# Patient Record
Sex: FEMALE | Race: WHITE | NOT HISPANIC OR LATINO | URBAN - METROPOLITAN AREA
[De-identification: names, ages, dates, MRNs, and addresses within clinical notes are randomized per-mention and may not be internally consistent; named-entity substitution may affect disease eponyms.]

---

## 2019-02-27 ENCOUNTER — OUTPATIENT (OUTPATIENT)
Dept: OUTPATIENT SERVICES | Facility: HOSPITAL | Age: 69
LOS: 1 days | End: 2019-02-27
Payer: COMMERCIAL

## 2019-02-27 DIAGNOSIS — Z01.810 ENCOUNTER FOR PREPROCEDURAL CARDIOVASCULAR EXAMINATION: ICD-10-CM

## 2019-02-27 DIAGNOSIS — R06.02 SHORTNESS OF BREATH: ICD-10-CM

## 2019-02-27 DIAGNOSIS — R07.9 CHEST PAIN, UNSPECIFIED: ICD-10-CM

## 2019-02-27 PROCEDURE — 93018 CV STRESS TEST I&R ONLY: CPT

## 2019-02-27 PROCEDURE — 93016 CV STRESS TEST SUPVJ ONLY: CPT

## 2019-02-27 PROCEDURE — 93017 CV STRESS TEST TRACING ONLY: CPT

## 2019-04-05 ENCOUNTER — APPOINTMENT (OUTPATIENT)
Dept: PULMONOLOGY | Facility: CLINIC | Age: 69
End: 2019-04-05

## 2019-05-16 ENCOUNTER — APPOINTMENT (OUTPATIENT)
Dept: PULMONOLOGY | Facility: CLINIC | Age: 69
End: 2019-05-16
Payer: COMMERCIAL

## 2019-05-16 VITALS
OXYGEN SATURATION: 96 % | WEIGHT: 188 LBS | BODY MASS INDEX: 34.6 KG/M2 | DIASTOLIC BLOOD PRESSURE: 80 MMHG | SYSTOLIC BLOOD PRESSURE: 120 MMHG | HEART RATE: 85 BPM | HEIGHT: 62 IN

## 2019-05-16 DIAGNOSIS — Z82.49 FAMILY HISTORY OF ISCHEMIC HEART DISEASE AND OTHER DISEASES OF THE CIRCULATORY SYSTEM: ICD-10-CM

## 2019-05-16 PROCEDURE — 94010 BREATHING CAPACITY TEST: CPT

## 2019-05-16 PROCEDURE — 99204 OFFICE O/P NEW MOD 45 MIN: CPT | Mod: 25

## 2019-05-16 NOTE — CONSULT LETTER
[Dear  ___] : Dear  [unfilled], [Consult Letter:] : I had the pleasure of evaluating your patient, [unfilled]. [Please see my note below.] : Please see my note below. [Sincerely,] : Sincerely, [FreeTextEntry3] : John Ivan DO [facility]\par Pulmonary Critical Care\par Director Pulmonary Division\par Medical Director Respiratory Therapy\par [facility]\par \par

## 2019-05-16 NOTE — DISCUSSION/SUMMARY
[FreeTextEntry1] : 69-year-old never smoker\par History of asthmatic bronchitis only intermittently with colds, no chronic asthma hx\par Waxing and waning left posterior thorax pain radiating anteriorly\par Cardiac stress test negative, echocardiogram done but she does not know the results\par Number obvious thrombophilic risk factors, will obtain CT to better evaluate for thromboembolic disease and underlying anatomic abnormalities\par No dysuria or urinary symptoms, dyspepsia which does improve with antireflux therapy and I recommended she restart this\par Spirometry is normal as his lung exam and pulse oximetry\par Will reevaluate in 3 months or sooner if needed, we'll contact her sooner with CT scan

## 2019-05-16 NOTE — PHYSICAL EXAM
[General Appearance - Well Developed] : well developed [General Appearance - Well Nourished] : well nourished [Normal Conjunctiva] : the conjunctiva exhibited no abnormalities [Normal Oropharynx] : normal oropharynx [Neck Appearance] : the appearance of the neck was normal [II] : II [Heart Rate And Rhythm] : heart rate and rhythm were normal [Heart Sounds] : normal S1 and S2 [Murmurs] : no murmurs present [Edema] : no peripheral edema present [Respiration, Rhythm And Depth] : normal respiratory rhythm and effort [Exaggerated Use Of Accessory Muscles For Inspiration] : no accessory muscle use [Auscultation Breath Sounds / Voice Sounds] : lungs were clear to auscultation bilaterally [Lungs Percussion] : the lungs were normal to percussion [Abnormal Walk] : normal gait [Nail Clubbing] : no clubbing of the fingernails [] : no rash [Cyanosis, Localized] : no localized cyanosis [Oriented To Time, Place, And Person] : oriented to person, place, and time [No Focal Deficits] : no focal deficits [Affect] : the affect was normal [Impaired Insight] : insight and judgment were intact [Memory Recent] : recent memory was not impaired [FreeTextEntry1] : no chest wall abn

## 2019-05-16 NOTE — HISTORY OF PRESENT ILLNESS
[FreeTextEntry1] : L back and arm pain since November\par worse when burped\par more SOB\par saw Cardiology had stress and echocardiogram\par saw GI - not had any work up to date \par pain has improved\par denies any sig dyspnea\par no cough or sputum\par non smoker\par gets asthma with bronchitis

## 2019-06-03 ENCOUNTER — APPOINTMENT (OUTPATIENT)
Dept: CT IMAGING | Facility: CLINIC | Age: 69
End: 2019-06-03

## 2019-06-20 ENCOUNTER — MESSAGE (OUTPATIENT)
Age: 69
End: 2019-06-20

## 2019-06-21 ENCOUNTER — MESSAGE (OUTPATIENT)
Age: 69
End: 2019-06-21

## 2019-07-15 ENCOUNTER — OTHER (OUTPATIENT)
Age: 69
End: 2019-07-15

## 2019-08-19 ENCOUNTER — APPOINTMENT (OUTPATIENT)
Dept: PULMONOLOGY | Facility: CLINIC | Age: 69
End: 2019-08-19

## 2019-10-23 ENCOUNTER — APPOINTMENT (OUTPATIENT)
Dept: PULMONOLOGY | Facility: CLINIC | Age: 69
End: 2019-10-23

## 2019-11-11 ENCOUNTER — APPOINTMENT (OUTPATIENT)
Dept: PULMONOLOGY | Facility: CLINIC | Age: 69
End: 2019-11-11
Payer: COMMERCIAL

## 2019-11-11 VITALS
OXYGEN SATURATION: 97 % | BODY MASS INDEX: 34.96 KG/M2 | WEIGHT: 190 LBS | DIASTOLIC BLOOD PRESSURE: 78 MMHG | HEIGHT: 62 IN | HEART RATE: 88 BPM | SYSTOLIC BLOOD PRESSURE: 118 MMHG

## 2019-11-11 DIAGNOSIS — R07.81 PLEURODYNIA: ICD-10-CM

## 2019-11-11 DIAGNOSIS — R10.9 UNSPECIFIED ABDOMINAL PAIN: ICD-10-CM

## 2019-11-11 PROCEDURE — 99215 OFFICE O/P EST HI 40 MIN: CPT

## 2019-11-12 NOTE — HISTORY OF PRESENT ILLNESS
[FreeTextEntry1] : Having intermittent pain l flank with burping\par GI work up in progress\par no fever, chills, chests pain\par no sig sputum\par no sig dyspnea\par

## 2019-11-12 NOTE — CONSULT LETTER
[Dear  ___] : Dear  [unfilled], [Consult Letter:] : I had the pleasure of evaluating your patient, [unfilled]. [Please see my note below.] : Please see my note below. [Sincerely,] : Sincerely, [DrMiranda  ___] : Dr. SAUCEDO [FreeTextEntry3] : [facility] DO [facility]\par Pulmonary Critical Care\par Director Pulmonary Division\par Medical Director Respiratory Therapy\par [facility]\par \par

## 2019-11-12 NOTE — DISCUSSION/SUMMARY
[FreeTextEntry1] : 69-year-old , quit smoking 30 yrs ago, smoked 15 yrs\par History of asthmatic bronchitis only intermittently with colds, no chronic asthma hx\par CTA 6/19 limited but no sig emboli seen no R heart strain, minute nodule RUL, repeat 1 yr\par last spirometry was normal\par no acute pulmonary complaints\par GI work up planned, no urinary complaints\par No pulmonary contraindications to GI procedure\par at mild increased risk of post operative complications\par incentive spirometry, oximetry, DVT prophylaxis\par eventual repeat CT scan for  minute nodule, renal sonogram\par general work up per PMD, GI\par 5 months or sooner if needed

## 2019-11-12 NOTE — PROCEDURE
[FreeTextEntry1] : spirometry was  normal\par 6/19 CTA limited, but no evidence of any sig PE, no r heart strain \par Duplex negative

## 2019-11-12 NOTE — PHYSICAL EXAM
[General Appearance - Well Developed] : well developed [General Appearance - Well Nourished] : well nourished [Normal Conjunctiva] : the conjunctiva exhibited no abnormalities [Normal Oropharynx] : normal oropharynx [II] : II [Neck Appearance] : the appearance of the neck was normal [Heart Rate And Rhythm] : heart rate and rhythm were normal [Murmurs] : no murmurs present [Heart Sounds] : normal S1 and S2 [Edema] : no peripheral edema present [Respiration, Rhythm And Depth] : normal respiratory rhythm and effort [Exaggerated Use Of Accessory Muscles For Inspiration] : no accessory muscle use [Auscultation Breath Sounds / Voice Sounds] : lungs were clear to auscultation bilaterally [Lungs Percussion] : the lungs were normal to percussion [Abnormal Walk] : normal gait [Nail Clubbing] : no clubbing of the fingernails [Cyanosis, Localized] : no localized cyanosis [No Focal Deficits] : no focal deficits [Oriented To Time, Place, And Person] : oriented to person, place, and time [Impaired Insight] : insight and judgment were intact [Affect] : the affect was normal [Memory Recent] : recent memory was not impaired [] : no rash [FreeTextEntry1] : no chest wall abn

## 2020-04-06 ENCOUNTER — APPOINTMENT (OUTPATIENT)
Dept: PULMONOLOGY | Facility: CLINIC | Age: 70
End: 2020-04-06

## 2020-12-07 ENCOUNTER — APPOINTMENT (OUTPATIENT)
Dept: PULMONOLOGY | Facility: CLINIC | Age: 70
End: 2020-12-07
Payer: COMMERCIAL

## 2020-12-07 VITALS — WEIGHT: 164 LBS | BODY MASS INDEX: 30 KG/M2

## 2020-12-07 VITALS — HEART RATE: 77 BPM | OXYGEN SATURATION: 98 % | RESPIRATION RATE: 16 BRPM

## 2020-12-07 PROCEDURE — 99214 OFFICE O/P EST MOD 30 MIN: CPT

## 2020-12-07 PROCEDURE — 99072 ADDL SUPL MATRL&STAF TM PHE: CPT

## 2020-12-07 NOTE — DISCUSSION/SUMMARY
[FreeTextEntry1] : asthmatic bronchitis by hx, minute nodule on CT scan 6/19\par CTA 6/19 limited but no sig emboli seen no R heart strain, minute nodule RUL, repeat 1 yr\par last spirometry was normal\par no acute pulmonary complaints\par had flu vaccine\par 6-12 months or sooner if needed, will call with CT scan

## 2020-12-07 NOTE — PHYSICAL EXAM
[General Appearance - Well Developed] : well developed [General Appearance - Well Nourished] : well nourished [Normal Conjunctiva] : the conjunctiva exhibited no abnormalities [Normal Oropharynx] : normal oropharynx [II] : II [Neck Appearance] : the appearance of the neck was normal [Heart Rate And Rhythm] : heart rate and rhythm were normal [Heart Sounds] : normal S1 and S2 [Murmurs] : no murmurs present [Edema] : no peripheral edema present [Respiration, Rhythm And Depth] : normal respiratory rhythm and effort [Exaggerated Use Of Accessory Muscles For Inspiration] : no accessory muscle use [Auscultation Breath Sounds / Voice Sounds] : lungs were clear to auscultation bilaterally [Lungs Percussion] : the lungs were normal to percussion [FreeTextEntry1] : no chest wall abn [Abnormal Walk] : normal gait [Nail Clubbing] : no clubbing of the fingernails [Cyanosis, Localized] : no localized cyanosis [No Focal Deficits] : no focal deficits [Oriented To Time, Place, And Person] : oriented to person, place, and time [Impaired Insight] : insight and judgment were intact [Affect] : the affect was normal [Memory Recent] : recent memory was not impaired [] : no rash

## 2020-12-07 NOTE — HISTORY OF PRESENT ILLNESS
[FreeTextEntry1] : atypical flank pain much improved\par no fever, chills, chests pain\par no sig sputum\par no sig dyspnea\par

## 2020-12-07 NOTE — CONSULT LETTER
[Dear  ___] : Dear  [unfilled], [Consult Letter:] : I had the pleasure of evaluating your patient, [unfilled]. [Please see my note below.] : Please see my note below. [Sincerely,] : Sincerely, [FreeTextEntry3] : John Ivan DO [facility]\par Pulmonary Critical Care\par Director Pulmonary Division\par Medical Director Respiratory Therapy\par [facility]\par \par  [DrMiranda  ___] : Dr. SAUCEDO

## 2021-06-14 ENCOUNTER — APPOINTMENT (OUTPATIENT)
Dept: PULMONOLOGY | Facility: CLINIC | Age: 71
End: 2021-06-14

## 2021-10-31 ENCOUNTER — EMERGENCY (EMERGENCY)
Facility: HOSPITAL | Age: 71
LOS: 1 days | Discharge: HOME | End: 2021-10-31
Attending: EMERGENCY MEDICINE
Payer: COMMERCIAL

## 2021-10-31 VITALS
TEMPERATURE: 99 F | RESPIRATION RATE: 18 BRPM | HEIGHT: 62 IN | SYSTOLIC BLOOD PRESSURE: 111 MMHG | HEART RATE: 86 BPM | OXYGEN SATURATION: 100 % | WEIGHT: 160.06 LBS | DIASTOLIC BLOOD PRESSURE: 59 MMHG

## 2021-10-31 LAB — SARS-COV-2 RNA SPEC QL NAA+PROBE: SIGNIFICANT CHANGE UP

## 2021-10-31 PROCEDURE — 99284 EMERGENCY DEPT VISIT MOD MDM: CPT

## 2021-10-31 PROCEDURE — U0003: CPT

## 2021-10-31 PROCEDURE — U0005: CPT

## 2021-10-31 PROCEDURE — 99283 EMERGENCY DEPT VISIT LOW MDM: CPT

## 2021-10-31 NOTE — ED STATDOCS - BIRTH SEX
Dr Noemi Lee returned page to unit, updated on lab work results. FHTs and contraction pattern discussed. Orders received. Female

## 2021-10-31 NOTE — ED STATDOCS - NSICDXNOPASTMEDICALHX_GEN_ALL_ED
<-- Click to add NO pertinent Past Medical History I will STOP taking the medications listed below when I get home from the hospital:  None

## 2021-10-31 NOTE — ED STATDOCS - PATIENT PORTAL LINK FT
You can access the FollowMyHealth Patient Portal offered by [facility] by registering at the following website: [email or web address]. By joining [employer]’s FollowMyHealth portal, you will also be able to view your health information using other applications (apps) compatible with our system.

## 2021-10-31 NOTE — ED STATDOCS - NSFOLLOWUPINSTRUCTIONS_ED_ALL_ED_FT
READ ALL ATTACHED INSTRUCTIONS FOR CORONAVIRUS IMMEDIATELY   -You were tested for COVID19 (Coronavirus) during your visit in the Emergency Department.   -Results will take 1-2 days.  You may call [phone] to obtain results.  -Do NOT return to work/school/public areas until your COVID test results as negative.   -SELF QUARANTINE until COVID result is available.   -Avoid contact with others.   -Wash your hands frequently. Disinfect surfaces frequently    SEEK IMMEDIATE MEDICAL CARE IF YOU HAVE ANY OF THE FOLLOWING SYMPTOMS  **If you develop worsening or new symptoms such as shortness of breath, difficulty breathing, chest pain, confusion, severe weakness, or anything concerning to you, please seek immediate medical care or return to the ER .**

## 2021-10-31 NOTE — ED STATDOCS - CARE PLAN
1 Principal Discharge DX:	URI (upper respiratory infection)  Secondary Diagnosis:	Encounter for laboratory testing for COVID-19 virus

## 2021-10-31 NOTE — ED STATDOCS - OBJECTIVE STATEMENT
nasal congestion, sore throat and cough since friday. no fever.  no loss taste/ smell.  no SOB.  had covid vaccine in february.

## 2021-11-03 DIAGNOSIS — Z20.822 CONTACT WITH AND (SUSPECTED) EXPOSURE TO COVID-19: ICD-10-CM

## 2021-11-03 DIAGNOSIS — R05.9 COUGH, UNSPECIFIED: ICD-10-CM

## 2021-11-03 DIAGNOSIS — Z87.891 PERSONAL HISTORY OF NICOTINE DEPENDENCE: ICD-10-CM

## 2021-11-03 DIAGNOSIS — R09.81 NASAL CONGESTION: ICD-10-CM

## 2021-11-03 DIAGNOSIS — J06.9 ACUTE UPPER RESPIRATORY INFECTION, UNSPECIFIED: ICD-10-CM

## 2021-11-03 DIAGNOSIS — Z88.0 ALLERGY STATUS TO PENICILLIN: ICD-10-CM

## 2021-11-22 ENCOUNTER — APPOINTMENT (OUTPATIENT)
Dept: ORTHOPEDIC SURGERY | Facility: CLINIC | Age: 71
End: 2021-11-22
Payer: COMMERCIAL

## 2021-11-22 VITALS
BODY MASS INDEX: 29.44 KG/M2 | SYSTOLIC BLOOD PRESSURE: 113 MMHG | DIASTOLIC BLOOD PRESSURE: 59 MMHG | WEIGHT: 160 LBS | HEIGHT: 62 IN | HEART RATE: 68 BPM

## 2021-11-22 DIAGNOSIS — M25.511 PAIN IN RIGHT SHOULDER: ICD-10-CM

## 2021-11-22 PROCEDURE — 99203 OFFICE O/P NEW LOW 30 MIN: CPT | Mod: 25

## 2021-11-22 PROCEDURE — 20610 DRAIN/INJ JOINT/BURSA W/O US: CPT | Mod: RT

## 2021-11-22 NOTE — PHYSICAL EXAM
[de-identified] : General:\par Awake, alert, no acute distress, Patient was cooperative and appropriate during the examination.\par \par The patient is overweight for height and age.\par \par Walks without an antalgic gait.\par \par Full, painless range of motion of the neck and back.\par \par Exam of the bilateral lower extremities is intact and symmetric with regards to dermatologic, vascular, and neurologic exam. Bilateral lower extremity sensation is grossly intact to light touch in the DP/SP/T/S/S nerve distributions. Intact DF/PF/EHL. BIlateral lower extremities warm and well-perfused with brisk capillary refill.\par \par \par Pulmonary:\par Regular, nonlabored breathing\par \par Abdomen:\par Soft, nontender, nondistended.\par \par Lymphatic:\par No evidence of axillary lymphadenopathy\par \par Right shoulder Exam:\par Physical exam of the shoulder demonstrates normal skin without signs of skin changes or abnormalities. No erythema, warmth, or joint effusion appreciated. \par \par Sensation intact light touch C5-T1\par Palpable radial pulse\par Radial/ulnar/median/axillary/musculocutaneous/AIN/PIN nerves grossly intact\par \par Range of motion:\par Forward Flexion: 170\par Abduction: 50\par External Rotation: 45\par Internal Rotation: L3\par \par Palpation:\par Not tender to palpation over the glenohumeral joint\par Moderately tender palpation over the rotator cuff insertion on the greater tuberosity\par Not tender to palpation over the AC joint\par Mildly tender to palpation of the biceps tendon/bicipital groove\par \par Strength testing:\par Supraspinatus: 5/5\par Infraspinatus: 5/5\par Subscapularis: 5/5\par \par Special test:\par Empty can test positive\par Mccoy impingement test positive\par Speeds test negative\par Charles Mix's test negative\par Lift-off test negative\par Bell-press test negative\par Cross-arm adduction test negative\par \par  [de-identified] : X-rays including multiple views of the bilateral shoulders from [facility] radiology on 10/4/2021 reviewed the patient today in the office. There is no acute fracture or dislocation. There are cystic changes seen in the region of the greater tuberosity bilaterally which can be associated with rotator cuff pathology.

## 2021-11-22 NOTE — CONSULT LETTER
[Dear  ___] : Dear  [unfilled], [Consult Letter:] : I had the pleasure of evaluating your patient, [unfilled]. [( Thank you for referring [unfilled] for consultation for _____ )] : Thank you for referring [unfilled] for consultation for [unfilled] [Please see my note below.] : Please see my note below. [Consult Closing:] : Thank you very much for allowing me to participate in the care of this patient.  If you have any questions, please do not hesitate to contact me. [Sincerely,] : Sincerely, [FreeTextEntry2] : [facility] VELAZCO\par  [FreeTextEntry3] : Nile Rubin DO.\par Sports Medicine \par Orthopaedic Surgery\par \par [facility]\par [facility] \par [address] \par [address] \[place] [address]\par \par Tel [phone]\par Fax [phone]\par \par For same day and next day orthopedic appointments contact:\par [email or web address] |[phone])\par Appointments available nights and weekends!  \par \par [facility]\par Visit us at [facility]/[email or web address]\par

## 2021-11-22 NOTE — PROCEDURE
[de-identified] : I injected the patient's right shoulder today with cortisone.\par \par I discussed at length with the patient the planned steroid and lidocaine injection. The risks, benefits, convalescence and alternatives were reviewed. The possible side effects discussed included but were not limited to: pain, swelling, heat, bleeding, and redness. Symptoms are generally mild but if they are extensive then contact the office. Giving pain relievers by mouth such as NSAIDs or Tylenol can generally treat the reactions to steroid and lidocaine. Rare cases of infection have been noted. Rash, hives and itching may occur post injection. If you have muscle pain or cramps, flushing and or swelling of the face, rapid heart beat, nausea, dizziness, fever, chills, headache, difficulty breathing, swelling in the arms or legs, or have a prickly feeling of your skin, contact a health care provider immediately. Following this discussion, the shoulder was prepped with Chlorhexidine and Alcohol and under sterile conditions the 80 mg Depo-Medrol and 4 cc Lidocaine injection was performed with a 22 gauge needle through a posterolateral injection site. The needle was introduced into the subacromial space and the medication was injected. Upon withdrawal of the needle the site was cleaned with alcohol and a band aid was applied. The patient tolerated the injection well and there were no adverse effects. Post injection instructions included no strenuous activity for 24 hours, cryotherapy and if there are any adverse effects to contact the office.
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
## 2021-11-22 NOTE — DISCUSSION/SUMMARY
[de-identified] : Assessment: 71-year-old female with right shoulder pain secondary to rotator cuff tendinitis and bursitis\par \par Plan: I had a long discussion with the patient today regarding the nature of their diagnosis and treatment plan. We discussed the risks and benefits of no treatment as well as nonoperative and operative treatments. I reviewed the patient's x-rays today with her in the office which are negative for any acute pathology. On examination she has positive impingement signs. She has good rotator cuff strength. At this time I recommend conservative treatment of the patient's condition with modalities including rest, ice, heat, anti-inflammatory medications, activity modifications, and home stretching and strengthening exercises daily. She may take meloxicam as prescribed by her primary care physician. I discussed with the patient the risks and benefits associated with NSAID use. A home exercise program was provided today to begin working on stretches and strengthening exercises for her shoulder to help improve her pain and function. Her insurance company will not pay for physical therapy. She also received a cortisone injection today in the office under sterile conditions to the right shoulder subacromial space. The patient tolerated this well there were no adverse events. Recommend follow-up in 8 weeks for repeat clinical evaluation. If the patient continues to have pain at that time we will consider an MRI.\par \par The patient verbalizes their understanding and agrees with the plan.  All questions were answered to their satisfaction.

## 2022-03-01 ENCOUNTER — APPOINTMENT (OUTPATIENT)
Dept: SURGERY | Facility: CLINIC | Age: 72
End: 2022-03-01
Payer: COMMERCIAL

## 2022-03-01 VITALS
SYSTOLIC BLOOD PRESSURE: 142 MMHG | WEIGHT: 165 LBS | DIASTOLIC BLOOD PRESSURE: 84 MMHG | BODY MASS INDEX: 30.36 KG/M2 | OXYGEN SATURATION: 98 % | HEIGHT: 62 IN | TEMPERATURE: 97.8 F | HEART RATE: 82 BPM

## 2022-03-01 DIAGNOSIS — Z00.00 ENCOUNTER FOR GENERAL ADULT MEDICAL EXAMINATION W/OUT ABNORMAL FINDINGS: ICD-10-CM

## 2022-03-01 PROCEDURE — 99205 OFFICE O/P NEW HI 60 MIN: CPT

## 2022-03-01 NOTE — HISTORY OF PRESENT ILLNESS
[FreeTextEntry1] : Re: newly diagnosed left breast subareolar DCIS found after abnormal breast imaging demonstrating calcifications. 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 \par \par Stacey has a history of bilateral breast augmentation performed in [place] 20 years ago. She has not since undergone revision. \par \par She recently underwent abnormal breast imaging with mammogram demonstrating an area of subareolar left breast microcalcifications. She underwent biopsy demonstrating grade 2 DCIS Er negative Pr negative. MRI evaluation was performed and demonstrated an area of 2.5cm suspicious for hematoma at the site of biopsy. No adenopathy was noted. Of note, a right breast nodule was demonstrated on MRI of the breast felt to be a intramammary node with a recommendation for 6 month follow up MRI. \par \par Imaging: [facility]\par MRI of the breast 2/18/2022\par Impression: Left breast 2.5cm enhancement with clip and surrounding low grade enhancement in subareolar region. right breast 5mm superficial enhancing mass at the 6:00 middle depth. BIRADS 6\par Bilateral screening mammogram/tomosynthesis 12/1/2021 BIRADS 0\par Left diagnostic mammogram/tomosynthesis Grouped heterogenous calcifications in the retroareolar left breast are indeterminate. Biopsy is recommended. BIRADS 4\par \par 2/6/2022 Stereotactic core biopsy of left breast: intermediate grade DCIS Er negative Pr negative\par \par We reviewed and discussed pathology.   Stacey  understands that the diagnosis is of left breast stage 0 breast cancer and that recommendation is for surgical management followed by adjuvant treatment.  We thoroughly discussed pathophysiology of ductal carcinoma in situ as well as treatment of surgical breast conservation v mastectomy.\par \par We discussed technical aspects of surgical management which includes left/right breast localized (needle or ERICA ) wide lumpectomy, the need for clear margins and radiation.  We discussed the need for radiation to decrease the locoregional recurrence by up to 50%.  We also discussed 5-6 weeks for standard radiation however she will discuss specifics of radiation with one of the radiation oncologists.\par \par We also discussed sentinel lymph node sampling if she chooses to undergo mastectomy secondary to the chance of finding an occult breast cancer.  Technical aspects were discussed including the need for blue dye, radiotracer and axillary dissection if the nodes are positive. She also understands that a drain would be placed if an axillary dissection is required. If sentinel mapping fails. then we will await final pathology to determine need for axillary dissection.\par \par Furthermore we discussed delayed sentinel lymph node mapping with magtrace. Risks v benefits and technical aspects were discussed.\par \par Risks v benefits of surgical options discussed as well as recommendation for adjuvant hormonal therapy with aromatase inhibitor for 5-10 years.\par \par She is leaning toward mastectomy secondary to previous history of surgery and possibility of avoiding radiation. \par \par She will be referred to Radiation Oncology and Plastic Surgery. \par \par Genetic testing performed. \par \par She understands and agrees to the plan. \par All questions were answered.

## 2022-03-01 NOTE — PHYSICAL EXAM
[Normocephalic] : normocephalic [Atraumatic] : atraumatic [EOMI] : extra ocular movement intact [PERRL] : pupils equal, round and reactive to light [Sclera nonicteric] : sclera nonicteric [Supple] : supple [No Supraclavicular Adenopathy] : no supraclavicular adenopathy [Examined in the supine and seated position] : examined in the supine and seated position [No dominant masses] : no dominant masses in right breast  [No dominant masses] : no dominant masses left breast [No Nipple Retraction] : no left nipple retraction [No Nipple Discharge] : no left nipple discharge [No Axillary Lymphadenopathy] : no left axillary lymphadenopathy [No Edema] : no edema [No Rashes] : no rashes [No Ulceration] : no ulceration [de-identified] : No supraclavicular or axillary adenopathy. No dominant breast mass, normal to palpation. Everted nipple without discharge. No skin changes. [de-identified] : No supraclavicular or axillary adenopathy. No dominant breast mass, normal to palpation. Everted nipple without discharge. No skin changes.

## 2022-03-01 NOTE — ASSESSMENT
[FreeTextEntry1] : 70 yo postmenopausal female presents with abnormal breast imaging and biopsy demonstrating left breast DCIS grade 2 ER negative WI negative. She is leaning toward mastectomy and understands sentinel lymph node biopsy will be recommended for the possibility of occult invasive carcinoma. \par 1. Plastic Surgery Consult\par 2. Radiation Oncology Consult\par 3. Medical Oncology Consult\par 4. Genetic testing\par 5. MRI of the breast was reviewed and demonstrated 2.5cm enhancement likely a hematoma associated with biopsy proven DCIS of the left breast and 5mm nodule thought to be an intramammary lymph node on the right. Internal review of imaging has been requested. \par 6. Follow up in 2 weeks for final surgical decision

## 2022-03-03 ENCOUNTER — OUTPATIENT (OUTPATIENT)
Dept: OUTPATIENT SERVICES | Facility: HOSPITAL | Age: 72
LOS: 1 days | Discharge: HOME | End: 2022-03-03

## 2022-03-03 DIAGNOSIS — Z79.890 HORMONE REPLACEMENT THERAPY: ICD-10-CM

## 2022-03-14 ENCOUNTER — APPOINTMENT (OUTPATIENT)
Dept: HEMATOLOGY ONCOLOGY | Facility: CLINIC | Age: 72
End: 2022-03-14

## 2022-03-14 ENCOUNTER — OUTPATIENT (OUTPATIENT)
Dept: OUTPATIENT SERVICES | Facility: HOSPITAL | Age: 72
LOS: 1 days | Discharge: HOME | End: 2022-03-14

## 2022-03-14 ENCOUNTER — RESULT REVIEW (OUTPATIENT)
Age: 72
End: 2022-03-14

## 2022-03-14 ENCOUNTER — APPOINTMENT (OUTPATIENT)
Dept: RADIATION ONCOLOGY | Facility: CLINIC | Age: 72
End: 2022-03-14
Payer: COMMERCIAL

## 2022-03-14 ENCOUNTER — APPOINTMENT (OUTPATIENT)
Dept: HEMATOLOGY ONCOLOGY | Facility: CLINIC | Age: 72
End: 2022-03-14
Payer: COMMERCIAL

## 2022-03-14 VITALS — OXYGEN SATURATION: 95 % | WEIGHT: 170 LBS | HEART RATE: 87 BPM | BODY MASS INDEX: 31.28 KG/M2 | HEIGHT: 62 IN

## 2022-03-14 VITALS
SYSTOLIC BLOOD PRESSURE: 138 MMHG | RESPIRATION RATE: 16 BRPM | OXYGEN SATURATION: 96 % | BODY MASS INDEX: 31.09 KG/M2 | HEART RATE: 81 BPM | WEIGHT: 170 LBS | DIASTOLIC BLOOD PRESSURE: 76 MMHG

## 2022-03-14 DIAGNOSIS — C50.112 MALIGNANT NEOPLASM OF CENTRAL PORTION OF LEFT FEMALE BREAST: ICD-10-CM

## 2022-03-14 DIAGNOSIS — Z87.891 PERSONAL HISTORY OF NICOTINE DEPENDENCE: ICD-10-CM

## 2022-03-14 DIAGNOSIS — Z80.9 FAMILY HISTORY OF MALIGNANT NEOPLASM, UNSPECIFIED: ICD-10-CM

## 2022-03-14 PROCEDURE — 99204 OFFICE O/P NEW MOD 45 MIN: CPT | Mod: 25

## 2022-03-14 PROCEDURE — 99205 OFFICE O/P NEW HI 60 MIN: CPT

## 2022-03-14 RX ORDER — SIMVASTATIN 80 MG/1
TABLET, FILM COATED ORAL
Refills: 0 | Status: ACTIVE | COMMUNITY

## 2022-03-14 NOTE — CONSULT LETTER
[Dear  ___] : Dear  [unfilled], [Consult Letter:] : I had the pleasure of evaluating your patient, [unfilled]. [Please see my note below.] : Please see my note below. [Consult Closing:] : Thank you very much for allowing me to participate in the care of this patient.  If you have any questions, please do not hesitate to contact me. [Sincerely,] : Sincerely, [FreeTextEntry3] : Rosa Martinez MD\par Medical Oncology/Hematology\par [facility] [place], [facility]\par \par \par [facility]\par

## 2022-03-14 NOTE — ASSESSMENT
[FreeTextEntry1] : 71 year old female with newly diagnosed left breast DCIS, grade 2, ER Negative [place] Negative. \par \par Today we discussed the available radiographic and pathologic data in detail. We also discussed the natural history of DCIS, as well as management options. \par Discussed that there is no role for adjuvant endocrine therapy for ER negative DCIS, however could consider chemoprevention for ER+ breast cancer in the event she has lumpectomy or unilateral mastectomy. \par Also discussed potential for discussion of treatment in case invasive carcinoma is found.\par \par Plan:\par RTO 4 weeks after surgery to review final surgical pathology and finalize treatment plan

## 2022-03-14 NOTE — HISTORY OF PRESENT ILLNESS
[de-identified] : Ms. Rosenberg is a 71 year old female with newly diagnosed left breast DCIS, grade 2, ER Negative [place] Negative at age 71.\par \par Patient underwent screening mammogram on 21 demonstrating newly grouped calcifications in the anterior inferior left breast at 6:00 axis with recommendation for additional imaging. Obtained left diagnostic mammogram on 1/10/22 demonstrating grouped heterogenous calcifications in the retroareolar left breast with recommendation for biopsy. On 22 underwent left breast subareolar with Ca+ biopsy demonstrating DCIS, grade 2, ER Negative [place] Negative. \par \par Tentative plan for bilateral mastectomy with Dr. Ibrahim \par \par PMH: Hyperlipidemia \par PSH: Tubal ligation,  Section\par SH: Former smoker, quit ~36 years ago. Has 3 children\par FH: Sister: "nose tumor"

## 2022-03-15 ENCOUNTER — APPOINTMENT (OUTPATIENT)
Dept: PLASTIC SURGERY | Facility: CLINIC | Age: 72
End: 2022-03-15
Payer: COMMERCIAL

## 2022-03-15 VITALS
TEMPERATURE: 97.5 F | DIASTOLIC BLOOD PRESSURE: 70 MMHG | HEIGHT: 61.5 IN | BODY MASS INDEX: 31.31 KG/M2 | HEART RATE: 76 BPM | OXYGEN SATURATION: 95 % | SYSTOLIC BLOOD PRESSURE: 128 MMHG | RESPIRATION RATE: 16 BRPM | WEIGHT: 168 LBS

## 2022-03-15 PROBLEM — Z87.891 FORMER SMOKER: Status: ACTIVE | Noted: 2019-05-16

## 2022-03-15 PROBLEM — C50.112 MALIGNANT NEOPLASM OF CENTRAL PORTION OF LEFT FEMALE BREAST, UNSPECIFIED ESTROGEN RECEPTOR STATUS: Status: ACTIVE | Noted: 2022-03-15

## 2022-03-15 PROBLEM — Z80.9 FAMILY HISTORY OF MALIGNANT NEOPLASM: Status: ACTIVE | Noted: 2022-03-14

## 2022-03-15 PROCEDURE — 99204 OFFICE O/P NEW MOD 45 MIN: CPT

## 2022-03-15 NOTE — LETTER CLOSING
[Consult Closing:] : Thank you for allowing me to participate in the care of this patient.  If you have any questions, please do not hesitate to contact me. [Sincerely yours,] : Sincerely yours, [FreeTextEntry3] : Brody Bonilla MD\par Physician in Chief\par Department of Radiation Medicine\par [facility] [place]\par [facility] [place]\par \par  of Radiation Medicine\par [facility]\par at  [facility]\par \par Radiation \par [facility]/\par [facility] [place]\par [address]\par [place], [place] [address]\par \par Tel: [phone]\par Fax: [phone]\par

## 2022-03-15 NOTE — PHYSICAL EXAM
[Normal] : normoactive bowel sounds, soft and nontender, no hepatosplenomegaly or masses appreciated [de-identified] : s/p reduction mammoplasties, D cup size

## 2022-03-15 NOTE — HISTORY OF PRESENT ILLNESS
[FreeTextEntry1] : This 71 year-old female presents for radiation medicine consultation.  Ms. Rosenberg was diagnosed with left breast subareolar DCIS on 2/8/2022.\par \par Stacey has a history of bilateral breast augmentation performed in [place] 20 years ago. She has not since undergone revision. \par \par Imaging: [facility]\par MRI of the breast 2/18/2022\par Impression: Left breast 2.5cm enhancement with clip and surrounding low grade enhancement in subareolar region. right breast 5mm superficial enhancing mass at the 6:00 middle depth. BIRADS 6\par Bilateral screening mammogram/tomosynthesis 12/1/2021 BIRADS 0\par Left diagnostic mammogram/tomosynthesis Grouped heterogenous calcifications in the retroareolar left breast are indeterminate. Biopsy is recommended. BIRADS 4\par \par 2/6/2022 Stereotactic core biopsy of left breast: intermediate grade DCIS ER negative, MI negative\par \par

## 2022-03-15 NOTE — OB/GYN HISTORY
[Currently In Menopause] : currently in menopause [___] : Full Term: [unfilled] [History of Hormone Replacement Therapy] : no history of hormone replacement therapy

## 2022-03-15 NOTE — REVIEW OF SYSTEMS
[Negative] : Allergic/Immunologic [FreeTextEntry3] : Hx retinal detachment, at risk for open angle glaucoma [FreeTextEntry7] : GERD [FreeTextEntry9] : chronic pain shoulders, lower back [de-identified] : Hx Vitamin D deficiency

## 2022-03-16 LAB — SURGICAL PATHOLOGY STUDY: SIGNIFICANT CHANGE UP

## 2022-03-17 NOTE — ASSESSMENT
[FreeTextEntry1] : The patient was counseled about reconstructive options following mastectomy, including autologus, prosthetic, and the options of foregoing reconstruction.  Additionally, she was explained the options regarding the timing of reconstruction, including immediate reconstruction at the time of mastectomy or delayed reconstruction at a later date. \par \par The patient was extensively counseled on the operation and the perioperative recoveries of both autologous and prosthetic reconstructions.  The patient understands the risks with abdominally based microsurgical reconstruction including partial or total flap loss, revisit to the operating room in the vijay-operative period, wound dehiscence, mastectomy skin flap necrosis, fat necrosis, abdominal wall morbidity (laxity, bulge, hernia), asymmetry, parasthesia, seroma, hematoma, and infection.  She also understands the risks with implant-based reconstruction including infection, implant malposition, extrusion, deflation, capsular contracture, mastectomy skin flap necrosis, wound dehiscence, asymmetry, seroma, parasthesia, and hematoma. \par \par The patient understands all of these risks and she provides informed consent.\par \par The plan as of now is if b/l mastectomy then bilateral REEMA with alloderm placement for volume

## 2022-03-17 NOTE — PHYSICAL EXAM
[NI] : Normal [de-identified] : please see scanned in breast sheet\par SN-N: L - 33, R - 31\par N-IMF: L - 19, R - 19 1/2\par BW: L/R - 14\par high riding breast implants\par grade 3 ptosis bilaterally

## 2022-03-17 NOTE — HISTORY OF PRESENT ILLNESS
[FreeTextEntry1] : Ms. ARIK WATSON  is a 71 year  old female  with past medical history of Asthma, lung nodule, who presents with newly diagnosed left DCIS  breast cancer.  Pt was referred to the office by Dr Ibrahim  to discuss her reconstructive options. \par \par smoking status: quit 36 years ago\par anticoagulation: none\par history of bleeding disorder: negative\par family history of breast cancer:negative\par

## 2022-03-22 ENCOUNTER — APPOINTMENT (OUTPATIENT)
Dept: SURGERY | Facility: CLINIC | Age: 72
End: 2022-03-22
Payer: COMMERCIAL

## 2022-03-22 VITALS
DIASTOLIC BLOOD PRESSURE: 78 MMHG | TEMPERATURE: 97.7 F | SYSTOLIC BLOOD PRESSURE: 127 MMHG | OXYGEN SATURATION: 95 % | HEIGHT: 62 IN | WEIGHT: 169 LBS | BODY MASS INDEX: 31.1 KG/M2 | HEART RATE: 78 BPM

## 2022-03-22 PROCEDURE — 99214 OFFICE O/P EST MOD 30 MIN: CPT

## 2022-03-22 NOTE — PHYSICAL EXAM
[Normocephalic] : normocephalic [Atraumatic] : atraumatic [EOMI] : extra ocular movement intact [PERRL] : pupils equal, round and reactive to light [Sclera nonicteric] : sclera nonicteric [Supple] : supple [No Supraclavicular Adenopathy] : no supraclavicular adenopathy [Examined in the supine and seated position] : examined in the supine and seated position [Bra Size: ___] : Bra Size: [unfilled] [No dominant masses] : no dominant masses in right breast  [No dominant masses] : no dominant masses left breast [No Nipple Retraction] : no left nipple retraction [No Nipple Discharge] : no left nipple discharge [No Axillary Lymphadenopathy] : no left axillary lymphadenopathy [No Edema] : no edema [No Rashes] : no rashes [No Ulceration] : no ulceration [de-identified] : No supraclavicular or axillary adenopathy. No dominant breast mass, normal to palpation. Everted nipple without discharge. No skin changes. [de-identified] : No supraclavicular or axillary adenopathy. No dominant breast mass, normal to palpation. Everted nipple without discharge. No skin changes.

## 2022-03-22 NOTE — ASSESSMENT
[FreeTextEntry1] : 70 yo postmenopausal female presents with abnormal breast imaging and biopsy demonstrating left breast DCIS grade 2 ER negative ID negative. Plan for bilateral skin sparing mastectomy with SLNB possible AND and breast reconstruction.\par 1. Plastic Surgery follow up\par 2. Radiation Oncology follow up\par 3. Medical Oncology follow up\par 4.  Bilateral skin sparing mastectomy with SLNB possible AND and breast reconstruction.\par 5.  Follow up genetic results

## 2022-03-22 NOTE — HISTORY OF PRESENT ILLNESS
[FreeTextEntry1] : Re: newly diagnosed left breast subareolar DCIS found after abnormal breast imaging demonstrating calcifications. \par \par I had the pleasure of seeing Stacey Rosenberg in the office to discuss surgical planning secondary to newly diagnosed left breast Stage 0 breast cancer (DCIS) grade 2 ER negative ID negative. \par \par Stacey has a history of bilateral breast augmentation performed in [place] 20 years ago. She has not since undergone revision. \par \par She recently underwent abnormal breast imaging with mammogram demonstrating an area of subareolar left breast microcalcifications. She underwent biopsy demonstrating grade 2 DCIS Er negative Pr negative. MRI evaluation was performed and demonstrated an area of 2.5cm suspicious for hematoma at the site of biopsy. No adenopathy was noted. Of note, a right breast nodule was demonstrated on MRI of the breast felt to be a intramammary node with a recommendation for 6 month follow up MRI. \par \par Imaging: [facility]\par MRI of the breast 2/18/2022\par Impression: Left breast 2.5cm enhancement with clip and surrounding low grade enhancement in subareolar region. right breast 5mm superficial enhancing mass at the 6:00 middle depth. BIRADS 6\par Bilateral screening mammogram/tomosynthesis 12/1/2021 BIRADS 0\par Left diagnostic mammogram/tomosynthesis Grouped heterogenous calcifications in the retroareolar left breast are indeterminate. Biopsy is recommended. BIRADS 4\par \par 2/6/2022 Stereotactic core biopsy of left breast: intermediate grade DCIS Er negative Pr negative\par \par She met with medical oncology, radiation oncology, and plastics.  She has decided to move forward with bilateral mastectomy and breast reconstruction ( REEMA).  Genetic results are still pending and will follow up with her. Plan for bilateral skin sparing mastectomy with SLNB possible AND and breast reconstruction.  \par \par She understands and agrees with plan.  All questions answered.\par

## 2022-03-28 ENCOUNTER — OUTPATIENT (OUTPATIENT)
Dept: OUTPATIENT SERVICES | Facility: HOSPITAL | Age: 72
LOS: 1 days | End: 2022-03-28

## 2022-03-28 DIAGNOSIS — D05.12 INTRADUCTAL CARCINOMA IN SITU OF LEFT BREAST: ICD-10-CM

## 2022-04-04 ENCOUNTER — OUTPATIENT (OUTPATIENT)
Dept: OUTPATIENT SERVICES | Facility: HOSPITAL | Age: 72
LOS: 1 days | End: 2022-04-04
Payer: MEDICAID

## 2022-04-04 ENCOUNTER — APPOINTMENT (OUTPATIENT)
Dept: MRI IMAGING | Facility: CLINIC | Age: 72
End: 2022-04-04
Payer: COMMERCIAL

## 2022-04-04 DIAGNOSIS — D05.12 INTRADUCTAL CARCINOMA IN SITU OF LEFT BREAST: ICD-10-CM

## 2022-04-04 PROCEDURE — C8920: CPT

## 2022-04-04 PROCEDURE — 72198 MR ANGIO PELVIS W/O & W/DYE: CPT | Mod: 26

## 2022-04-04 PROCEDURE — C8902: CPT

## 2022-04-04 PROCEDURE — A9585: CPT

## 2022-04-04 PROCEDURE — 74185 MRA ABD W OR W/O CNTRST: CPT | Mod: 26

## 2022-04-14 ENCOUNTER — APPOINTMENT (OUTPATIENT)
Dept: PULMONOLOGY | Facility: CLINIC | Age: 72
End: 2022-04-14

## 2022-04-14 ENCOUNTER — APPOINTMENT (OUTPATIENT)
Dept: PULMONOLOGY | Facility: CLINIC | Age: 72
End: 2022-04-14
Payer: COMMERCIAL

## 2022-04-14 VITALS
DIASTOLIC BLOOD PRESSURE: 64 MMHG | HEART RATE: 75 BPM | OXYGEN SATURATION: 96 % | SYSTOLIC BLOOD PRESSURE: 118 MMHG | RESPIRATION RATE: 16 BRPM

## 2022-04-14 DIAGNOSIS — R91.1 SOLITARY PULMONARY NODULE: ICD-10-CM

## 2022-04-14 PROCEDURE — 94729 DIFFUSING CAPACITY: CPT

## 2022-04-14 PROCEDURE — 94727 GAS DIL/WSHOT DETER LNG VOL: CPT

## 2022-04-14 PROCEDURE — 94010 BREATHING CAPACITY TEST: CPT

## 2022-04-14 PROCEDURE — 99214 OFFICE O/P EST MOD 30 MIN: CPT | Mod: 25

## 2022-04-14 PROCEDURE — 85018 HEMOGLOBIN: CPT | Mod: QW

## 2022-04-14 RX ORDER — IBUPROFEN 600 MG/1
600 TABLET, FILM COATED ORAL
Qty: 30 | Refills: 0 | Status: DISCONTINUED | COMMUNITY
Start: 2022-03-07 | End: 2022-04-14

## 2022-04-14 RX ORDER — MELOXICAM 15 MG/1
15 TABLET ORAL
Qty: 30 | Refills: 0 | Status: DISCONTINUED | COMMUNITY
Start: 2021-10-25 | End: 2022-04-14

## 2022-04-14 RX ORDER — TRIAMCINOLONE ACETONIDE 0.25 MG/G
0.03 CREAM TOPICAL
Qty: 80 | Refills: 0 | Status: DISCONTINUED | COMMUNITY
Start: 2021-10-25 | End: 2022-04-14

## 2022-04-14 NOTE — PHYSICAL EXAM
[Normal Rate/Rhythm] : normal rate/rhythm [Normal S1, S2] : normal s1, s2 [No Resp Distress] : no resp distress [Clear to Auscultation Bilaterally] : clear to auscultation bilaterally

## 2022-04-14 NOTE — HISTORY OF PRESENT ILLNESS
[TextBox_4] : The patient is preoperative for bilateral subcutaneous mastectomy with reconstruction on April 25 at [facility]. She was followed in this office 2 years ago with a small right upper lobe lung nodule. There was stability over 18 months by CT scan. By history she's had episodes of asthmatic bronchitis but is on no medications currently. Denies shortness of breath cough wheeze or sleep disturbance.

## 2022-04-14 NOTE — REASON FOR VISIT
[Follow-Up] : a follow-up visit [Abnormal CXR/ Chest CT] : an abnormal CXR/ chest CT [Asthma] : asthma [Pre-op Risk Stratification] : pre-op risk stratification

## 2022-04-14 NOTE — ASSESSMENT
[FreeTextEntry1] : Patient with a benign behaving 3 mm right upper lobe nodule that does not need further followup. She has normal lung function without any active respiratory symptoms. She is cleared for surgery from a pulmonary point of view. Followup here p.r.n.

## 2022-04-18 ENCOUNTER — RESULT REVIEW (OUTPATIENT)
Age: 72
End: 2022-04-18

## 2022-04-18 ENCOUNTER — OUTPATIENT (OUTPATIENT)
Dept: OUTPATIENT SERVICES | Facility: HOSPITAL | Age: 72
LOS: 1 days | End: 2022-04-18
Payer: COMMERCIAL

## 2022-04-18 VITALS
SYSTOLIC BLOOD PRESSURE: 116 MMHG | WEIGHT: 167.55 LBS | HEIGHT: 62 IN | DIASTOLIC BLOOD PRESSURE: 46 MMHG | RESPIRATION RATE: 16 BRPM | OXYGEN SATURATION: 100 % | HEART RATE: 68 BPM | TEMPERATURE: 98 F

## 2022-04-18 DIAGNOSIS — D05.12 INTRADUCTAL CARCINOMA IN SITU OF LEFT BREAST: ICD-10-CM

## 2022-04-18 DIAGNOSIS — Z01.818 ENCOUNTER FOR OTHER PREPROCEDURAL EXAMINATION: ICD-10-CM

## 2022-04-18 DIAGNOSIS — Z98.890 OTHER SPECIFIED POSTPROCEDURAL STATES: Chronic | ICD-10-CM

## 2022-04-18 DIAGNOSIS — Z90.49 ACQUIRED ABSENCE OF OTHER SPECIFIED PARTS OF DIGESTIVE TRACT: Chronic | ICD-10-CM

## 2022-04-18 DIAGNOSIS — Z98.891 HISTORY OF UTERINE SCAR FROM PREVIOUS SURGERY: Chronic | ICD-10-CM

## 2022-04-18 DIAGNOSIS — Z98.51 TUBAL LIGATION STATUS: Chronic | ICD-10-CM

## 2022-04-18 DIAGNOSIS — Z98.82 BREAST IMPLANT STATUS: Chronic | ICD-10-CM

## 2022-04-18 LAB
ANION GAP SERPL CALC-SCNC: 3 MMOL/L — LOW (ref 5–17)
APPEARANCE UR: CLEAR — SIGNIFICANT CHANGE UP
APTT BLD: 34.5 SEC — SIGNIFICANT CHANGE UP (ref 27.5–35.5)
BASOPHILS # BLD AUTO: 0.06 K/UL — SIGNIFICANT CHANGE UP (ref 0–0.2)
BASOPHILS NFR BLD AUTO: 0.7 % — SIGNIFICANT CHANGE UP (ref 0–2)
BILIRUB UR-MCNC: NEGATIVE — SIGNIFICANT CHANGE UP
BUN SERPL-MCNC: 19 MG/DL — SIGNIFICANT CHANGE UP (ref 7–23)
CALCIUM SERPL-MCNC: 9.1 MG/DL — SIGNIFICANT CHANGE UP (ref 8.5–10.1)
CHLORIDE SERPL-SCNC: 110 MMOL/L — HIGH (ref 96–108)
CO2 SERPL-SCNC: 29 MMOL/L — SIGNIFICANT CHANGE UP (ref 22–31)
COLOR SPEC: YELLOW — SIGNIFICANT CHANGE UP
CREAT SERPL-MCNC: 0.64 MG/DL — SIGNIFICANT CHANGE UP (ref 0.5–1.3)
DIFF PNL FLD: ABNORMAL
EGFR: 95 ML/MIN/1.73M2 — SIGNIFICANT CHANGE UP
EOSINOPHIL # BLD AUTO: 0.29 K/UL — SIGNIFICANT CHANGE UP (ref 0–0.5)
EOSINOPHIL NFR BLD AUTO: 3.5 % — SIGNIFICANT CHANGE UP (ref 0–6)
GLUCOSE SERPL-MCNC: 107 MG/DL — HIGH (ref 70–99)
GLUCOSE UR QL: NEGATIVE — SIGNIFICANT CHANGE UP
HCT VFR BLD CALC: 41.5 % — SIGNIFICANT CHANGE UP (ref 34.5–45)
HGB BLD-MCNC: 13 G/DL — SIGNIFICANT CHANGE UP (ref 11.5–15.5)
IMM GRANULOCYTES NFR BLD AUTO: 0.4 % — SIGNIFICANT CHANGE UP (ref 0–1.5)
INR BLD: 0.99 RATIO — SIGNIFICANT CHANGE UP (ref 0.88–1.16)
KETONES UR-MCNC: NEGATIVE — SIGNIFICANT CHANGE UP
LEUKOCYTE ESTERASE UR-ACNC: NEGATIVE — SIGNIFICANT CHANGE UP
LYMPHOCYTES # BLD AUTO: 2.14 K/UL — SIGNIFICANT CHANGE UP (ref 1–3.3)
LYMPHOCYTES # BLD AUTO: 26 % — SIGNIFICANT CHANGE UP (ref 13–44)
MCHC RBC-ENTMCNC: 29 PG — SIGNIFICANT CHANGE UP (ref 27–34)
MCHC RBC-ENTMCNC: 31.3 GM/DL — LOW (ref 32–36)
MCV RBC AUTO: 92.6 FL — SIGNIFICANT CHANGE UP (ref 80–100)
MONOCYTES # BLD AUTO: 0.52 K/UL — SIGNIFICANT CHANGE UP (ref 0–0.9)
MONOCYTES NFR BLD AUTO: 6.3 % — SIGNIFICANT CHANGE UP (ref 2–14)
MRSA PCR RESULT.: SIGNIFICANT CHANGE UP
NEUTROPHILS # BLD AUTO: 5.18 K/UL — SIGNIFICANT CHANGE UP (ref 1.8–7.4)
NEUTROPHILS NFR BLD AUTO: 63.1 % — SIGNIFICANT CHANGE UP (ref 43–77)
NITRITE UR-MCNC: NEGATIVE — SIGNIFICANT CHANGE UP
PH UR: 5 — SIGNIFICANT CHANGE UP (ref 5–8)
PLATELET # BLD AUTO: 326 K/UL — SIGNIFICANT CHANGE UP (ref 150–400)
POTASSIUM SERPL-MCNC: 4.4 MMOL/L — SIGNIFICANT CHANGE UP (ref 3.5–5.3)
POTASSIUM SERPL-SCNC: 4.4 MMOL/L — SIGNIFICANT CHANGE UP (ref 3.5–5.3)
PROT UR-MCNC: NEGATIVE — SIGNIFICANT CHANGE UP
PROTHROM AB SERPL-ACNC: 11.5 SEC — SIGNIFICANT CHANGE UP (ref 10.5–13.4)
RBC # BLD: 4.48 M/UL — SIGNIFICANT CHANGE UP (ref 3.8–5.2)
RBC # FLD: 13.6 % — SIGNIFICANT CHANGE UP (ref 10.3–14.5)
S AUREUS DNA NOSE QL NAA+PROBE: SIGNIFICANT CHANGE UP
SODIUM SERPL-SCNC: 142 MMOL/L — SIGNIFICANT CHANGE UP (ref 135–145)
SP GR SPEC: 1.02 — SIGNIFICANT CHANGE UP (ref 1.01–1.02)
UROBILINOGEN FLD QL: NEGATIVE — SIGNIFICANT CHANGE UP
WBC # BLD: 8.22 K/UL — SIGNIFICANT CHANGE UP (ref 3.8–10.5)
WBC # FLD AUTO: 8.22 K/UL — SIGNIFICANT CHANGE UP (ref 3.8–10.5)

## 2022-04-18 PROCEDURE — 71046 X-RAY EXAM CHEST 2 VIEWS: CPT | Mod: 26

## 2022-04-18 PROCEDURE — 86900 BLOOD TYPING SEROLOGIC ABO: CPT

## 2022-04-18 PROCEDURE — 85730 THROMBOPLASTIN TIME PARTIAL: CPT

## 2022-04-18 PROCEDURE — 93005 ELECTROCARDIOGRAM TRACING: CPT

## 2022-04-18 PROCEDURE — 36415 COLL VENOUS BLD VENIPUNCTURE: CPT

## 2022-04-18 PROCEDURE — 80048 BASIC METABOLIC PNL TOTAL CA: CPT

## 2022-04-18 PROCEDURE — 71046 X-RAY EXAM CHEST 2 VIEWS: CPT

## 2022-04-18 PROCEDURE — 86901 BLOOD TYPING SEROLOGIC RH(D): CPT

## 2022-04-18 PROCEDURE — 86920 COMPATIBILITY TEST SPIN: CPT

## 2022-04-18 PROCEDURE — 87641 MR-STAPH DNA AMP PROBE: CPT

## 2022-04-18 PROCEDURE — G0463: CPT | Mod: 25

## 2022-04-18 PROCEDURE — 85025 COMPLETE CBC W/AUTO DIFF WBC: CPT

## 2022-04-18 PROCEDURE — 86850 RBC ANTIBODY SCREEN: CPT

## 2022-04-18 PROCEDURE — 81001 URINALYSIS AUTO W/SCOPE: CPT

## 2022-04-18 PROCEDURE — 85610 PROTHROMBIN TIME: CPT

## 2022-04-18 PROCEDURE — 87640 STAPH A DNA AMP PROBE: CPT

## 2022-04-18 PROCEDURE — 93010 ELECTROCARDIOGRAM REPORT: CPT

## 2022-04-18 NOTE — H&P PST ADULT - NSICDXPASTMEDICALHX_GEN_ALL_CORE_FT
PAST MEDICAL HISTORY:  Asthma URI- induced    Breast cancer, left     Breast mass, right     Hyperlipidemia     Pulmonary nodules Left--stable, followed by pulmonary Dr Kyle

## 2022-04-18 NOTE — H&P PST ADULT - NSICDXPASTSURGICALHX_GEN_ALL_CORE_FT
PAST SURGICAL HISTORY:  H/O breast implant Bilateral -->20 yrs ago    H/O  section     H/O colonoscopy 2022    H/O tubal ligation     History of cholecystectomy 20 yrs ago

## 2022-04-18 NOTE — H&P PST ADULT - HISTORY OF PRESENT ILLNESS
72 y.o  72 y.o WD, WN pleasant female presents to PST with hx of a left breast mass. Patient reports pain in breasts for a few months. She followed with her PCP and was sent for a mammogram which revealed a left breast mass. She states biopsy positive for cancer. Patient also reports an abnormality in her right breast. She discussed options with surgeon and now scheduled for Bilateral Skin Sparing Mastectomy with Left [place] Lymph Node Biopsy possible Axillary Dissection and Magatrace. Bilateral Breast Reconstruction with REEMA Flaps, Biopsy/Excision of Lymph Nodes, Partial Rib Resection, Suction Assisted Lipectomy, Implantation of Biologic Implant

## 2022-04-18 NOTE — H&P PST ADULT - ASSESSMENT
72 y.o  female scheduled for  72 y.o  female scheduled for  Bilateral Skin Sparing Mastectomy with Left [place] Lymph Node Biopsy possible Axillary Dissection and Magatrace. Bilateral Breast Reconstruction with REEMA Flaps, Biopsy/Excision of Lymph Nodes, Partial Rib Resection, Suction Assisted Lipectomy, Implantation of Biologic Implant   Plan  1. Stop all NSAIDS, herbal supplements and vitamins for 7 days.  2. NPO at midnight.  3. Take the following medications --none-- with small sips of water on the morning of your procedure/surgery.  4. Use EZ sponges as directed  5. Use mupirocin as directed  6. Labs, EKG, CXR as per surgeon  7. PMD LYNNE Villanueva & Dr Becerril cardiologist visit for optimization prior to surgery as per surgeon.  8. COVID swab appt: 4/22/2022

## 2022-04-18 NOTE — H&P PST ADULT - NSANTHOSAYNRD_GEN_A_CORE
No. BIJAN screening performed.  STOP BANG Legend: 0-2 = LOW Risk; 3-4 = INTERMEDIATE Risk; 5-8 = HIGH Risk

## 2022-04-18 NOTE — H&P PST ADULT - NSICDXFAMILYHX_GEN_ALL_CORE_FT
FAMILY HISTORY:  Family history of diabetes mellitus (DM), Mother, age 63   FH: HTN (hypertension), Father, age 73, ---also Heart disease

## 2022-04-18 NOTE — H&P PST ADULT - ATTENDING COMMENTS
Patient with left breast cancer now undergoing bilateral skin sparing mastectomy with left sentinel lymph node biopsy possible axillary dissection and magtrace injection for delayed sentinel lymph node biopsy. She understands technical aspects as well as risks v benefits.  Alternative reviewed thoroughly.  All questions were answered.

## 2022-04-19 DIAGNOSIS — Z01.818 ENCOUNTER FOR OTHER PREPROCEDURAL EXAMINATION: ICD-10-CM

## 2022-04-19 DIAGNOSIS — D05.12 INTRADUCTAL CARCINOMA IN SITU OF LEFT BREAST: ICD-10-CM

## 2022-04-20 PROBLEM — E78.5 HYPERLIPIDEMIA, UNSPECIFIED: Chronic | Status: ACTIVE | Noted: 2022-04-18

## 2022-04-20 PROBLEM — N63.10 UNSPECIFIED LUMP IN THE RIGHT BREAST, UNSPECIFIED QUADRANT: Chronic | Status: ACTIVE | Noted: 2022-04-18

## 2022-04-20 PROBLEM — R91.8 OTHER NONSPECIFIC ABNORMAL FINDING OF LUNG FIELD: Chronic | Status: ACTIVE | Noted: 2022-04-18

## 2022-04-20 PROBLEM — J45.909 UNSPECIFIED ASTHMA, UNCOMPLICATED: Chronic | Status: ACTIVE | Noted: 2022-04-18

## 2022-04-25 ENCOUNTER — INPATIENT (INPATIENT)
Facility: HOSPITAL | Age: 72
LOS: 2 days | Discharge: HOME | DRG: 581 | End: 2022-04-28
Attending: SURGERY | Admitting: SURGERY
Payer: COMMERCIAL

## 2022-04-25 ENCOUNTER — OUTPATIENT (OUTPATIENT)
Dept: OUTPATIENT SERVICES | Facility: HOSPITAL | Age: 72
LOS: 1 days | End: 2022-04-25
Payer: COMMERCIAL

## 2022-04-25 ENCOUNTER — APPOINTMENT (OUTPATIENT)
Dept: PLASTIC SURGERY | Facility: HOSPITAL | Age: 72
End: 2022-04-25
Payer: COMMERCIAL

## 2022-04-25 ENCOUNTER — RESULT REVIEW (OUTPATIENT)
Age: 72
End: 2022-04-25

## 2022-04-25 ENCOUNTER — TRANSCRIPTION ENCOUNTER (OUTPATIENT)
Age: 72
End: 2022-04-25

## 2022-04-25 ENCOUNTER — APPOINTMENT (OUTPATIENT)
Dept: SURGERY | Facility: HOSPITAL | Age: 72
End: 2022-04-25

## 2022-04-25 ENCOUNTER — APPOINTMENT (OUTPATIENT)
Dept: PLASTIC SURGERY | Facility: HOSPITAL | Age: 72
End: 2022-04-25

## 2022-04-25 VITALS
SYSTOLIC BLOOD PRESSURE: 129 MMHG | TEMPERATURE: 98 F | DIASTOLIC BLOOD PRESSURE: 60 MMHG | HEIGHT: 62 IN | HEART RATE: 79 BPM | WEIGHT: 167.55 LBS | OXYGEN SATURATION: 97 % | RESPIRATION RATE: 16 BRPM

## 2022-04-25 DIAGNOSIS — Z90.49 ACQUIRED ABSENCE OF OTHER SPECIFIED PARTS OF DIGESTIVE TRACT: Chronic | ICD-10-CM

## 2022-04-25 DIAGNOSIS — D05.12 INTRADUCTAL CARCINOMA IN SITU OF LEFT BREAST: ICD-10-CM

## 2022-04-25 DIAGNOSIS — Z98.51 TUBAL LIGATION STATUS: Chronic | ICD-10-CM

## 2022-04-25 DIAGNOSIS — Z98.82 BREAST IMPLANT STATUS: Chronic | ICD-10-CM

## 2022-04-25 DIAGNOSIS — Z98.891 HISTORY OF UTERINE SCAR FROM PREVIOUS SURGERY: Chronic | ICD-10-CM

## 2022-04-25 DIAGNOSIS — Z98.890 OTHER SPECIFIED POSTPROCEDURAL STATES: Chronic | ICD-10-CM

## 2022-04-25 PROCEDURE — 19371 PERI-IMPLT CAPSLC BRST COMPL: CPT | Mod: LT

## 2022-04-25 PROCEDURE — 88321 CONSLTJ&REPRT SLD PREP ELSWR: CPT

## 2022-04-25 PROCEDURE — 83735 ASSAY OF MAGNESIUM: CPT

## 2022-04-25 PROCEDURE — C1781: CPT

## 2022-04-25 PROCEDURE — 88304 TISSUE EXAM BY PATHOLOGIST: CPT

## 2022-04-25 PROCEDURE — 88334 PATH CONSLTJ SURG CYTO XM EA: CPT | Mod: 26

## 2022-04-25 PROCEDURE — 80048 BASIC METABOLIC PNL TOTAL CA: CPT

## 2022-04-25 PROCEDURE — P9016: CPT

## 2022-04-25 PROCEDURE — 19371 PERI-IMPLT CAPSLC BRST COMPL: CPT | Mod: RT

## 2022-04-25 PROCEDURE — 88300 SURGICAL PATH GROSS: CPT | Mod: 26,59

## 2022-04-25 PROCEDURE — 88333 PATH CONSLTJ SURG CYTO XM 1: CPT | Mod: 26

## 2022-04-25 PROCEDURE — 19303 MAST SIMPLE COMPLETE: CPT | Mod: 50

## 2022-04-25 PROCEDURE — 85027 COMPLETE CBC AUTOMATED: CPT

## 2022-04-25 PROCEDURE — 85018 HEMOGLOBIN: CPT

## 2022-04-25 PROCEDURE — 88304 TISSUE EXAM BY PATHOLOGIST: CPT | Mod: 26,59

## 2022-04-25 PROCEDURE — 38525 BIOPSY/REMOVAL LYMPH NODES: CPT | Mod: LT

## 2022-04-25 PROCEDURE — 36415 COLL VENOUS BLD VENIPUNCTURE: CPT

## 2022-04-25 PROCEDURE — 88342 IMHCHEM/IMCYTCHM 1ST ANTB: CPT

## 2022-04-25 PROCEDURE — 88300 SURGICAL PATH GROSS: CPT

## 2022-04-25 PROCEDURE — 88342 IMHCHEM/IMCYTCHM 1ST ANTB: CPT | Mod: 26,59

## 2022-04-25 PROCEDURE — 88307 TISSUE EXAM BY PATHOLOGIST: CPT

## 2022-04-25 PROCEDURE — 88334 PATH CONSLTJ SURG CYTO XM EA: CPT

## 2022-04-25 PROCEDURE — 85014 HEMATOCRIT: CPT

## 2022-04-25 PROCEDURE — 38900 IO MAP OF SENT LYMPH NODE: CPT | Mod: LT

## 2022-04-25 PROCEDURE — C1889: CPT

## 2022-04-25 PROCEDURE — 36430 TRANSFUSION BLD/BLD COMPNT: CPT

## 2022-04-25 PROCEDURE — 21600 PARTIAL REMOVAL OF RIB: CPT | Mod: LT,59

## 2022-04-25 PROCEDURE — 88333 PATH CONSLTJ SURG CYTO XM 1: CPT

## 2022-04-25 PROCEDURE — 99024 POSTOP FOLLOW-UP VISIT: CPT

## 2022-04-25 PROCEDURE — S2068: CPT | Mod: LT

## 2022-04-25 PROCEDURE — 38530 BIOPSY/REMOVAL LYMPH NODES: CPT | Mod: LT

## 2022-04-25 PROCEDURE — 88307 TISSUE EXAM BY PATHOLOGIST: CPT | Mod: 26,59

## 2022-04-25 PROCEDURE — 88305 TISSUE EXAM BY PATHOLOGIST: CPT

## 2022-04-25 PROCEDURE — A9520: CPT

## 2022-04-25 PROCEDURE — S2068: CPT | Mod: RT

## 2022-04-25 PROCEDURE — 88305 TISSUE EXAM BY PATHOLOGIST: CPT | Mod: 26,59

## 2022-04-25 PROCEDURE — 21600 PARTIAL REMOVAL OF RIB: CPT | Mod: 59,RT

## 2022-04-25 PROCEDURE — 38530 BIOPSY/REMOVAL LYMPH NODES: CPT | Mod: RT

## 2022-04-25 RX ORDER — ONDANSETRON 8 MG/1
4 TABLET, FILM COATED ORAL EVERY 6 HOURS
Refills: 0 | Status: DISCONTINUED | OUTPATIENT
Start: 2022-04-25 | End: 2022-04-28

## 2022-04-25 RX ORDER — HEPARIN SODIUM 5000 [USP'U]/ML
5000 INJECTION INTRAVENOUS; SUBCUTANEOUS EVERY 8 HOURS
Refills: 0 | Status: DISCONTINUED | OUTPATIENT
Start: 2022-04-25 | End: 2022-04-28

## 2022-04-25 RX ORDER — HYDROMORPHONE HYDROCHLORIDE 2 MG/ML
0.5 INJECTION INTRAMUSCULAR; INTRAVENOUS; SUBCUTANEOUS EVERY 4 HOURS
Refills: 0 | Status: DISCONTINUED | OUTPATIENT
Start: 2022-04-25 | End: 2022-04-28

## 2022-04-25 RX ORDER — SODIUM CHLORIDE 9 MG/ML
1000 INJECTION, SOLUTION INTRAVENOUS
Refills: 0 | Status: DISCONTINUED | OUTPATIENT
Start: 2022-04-25 | End: 2022-04-27

## 2022-04-25 RX ORDER — OXYCODONE HYDROCHLORIDE 5 MG/1
5 TABLET ORAL EVERY 4 HOURS
Refills: 0 | Status: DISCONTINUED | OUTPATIENT
Start: 2022-04-25 | End: 2022-04-28

## 2022-04-25 RX ORDER — ONDANSETRON 8 MG/1
4 TABLET, FILM COATED ORAL ONCE
Refills: 0 | Status: DISCONTINUED | OUTPATIENT
Start: 2022-04-25 | End: 2022-04-26

## 2022-04-25 RX ORDER — BACITRACIN ZINC 500 UNIT/G
1 OINTMENT IN PACKET (EA) TOPICAL THREE TIMES A DAY
Refills: 0 | Status: DISCONTINUED | OUTPATIENT
Start: 2022-04-25 | End: 2022-04-28

## 2022-04-25 RX ORDER — SODIUM CHLORIDE 9 MG/ML
1000 INJECTION, SOLUTION INTRAVENOUS
Refills: 0 | Status: DISCONTINUED | OUTPATIENT
Start: 2022-04-25 | End: 2022-04-26

## 2022-04-25 RX ORDER — KETOROLAC TROMETHAMINE 30 MG/ML
15 SYRINGE (ML) INJECTION EVERY 6 HOURS
Refills: 0 | Status: DISCONTINUED | OUTPATIENT
Start: 2022-04-25 | End: 2022-04-28

## 2022-04-25 RX ORDER — OXYCODONE HYDROCHLORIDE 5 MG/1
5 TABLET ORAL ONCE
Refills: 0 | Status: DISCONTINUED | OUTPATIENT
Start: 2022-04-25 | End: 2022-04-25

## 2022-04-25 RX ORDER — HYDROMORPHONE HYDROCHLORIDE 2 MG/ML
0.5 INJECTION INTRAMUSCULAR; INTRAVENOUS; SUBCUTANEOUS
Refills: 0 | Status: DISCONTINUED | OUTPATIENT
Start: 2022-04-25 | End: 2022-04-28

## 2022-04-25 RX ORDER — FENTANYL CITRATE 50 UG/ML
50 INJECTION INTRAVENOUS
Refills: 0 | Status: DISCONTINUED | OUTPATIENT
Start: 2022-04-25 | End: 2022-04-26

## 2022-04-25 RX ORDER — ACETAMINOPHEN 500 MG
650 TABLET ORAL EVERY 6 HOURS
Refills: 0 | Status: COMPLETED | OUTPATIENT
Start: 2022-04-25 | End: 2023-03-24

## 2022-04-25 RX ORDER — OXYCODONE HYDROCHLORIDE 5 MG/1
10 TABLET ORAL EVERY 4 HOURS
Refills: 0 | Status: DISCONTINUED | OUTPATIENT
Start: 2022-04-25 | End: 2022-04-28

## 2022-04-25 RX ORDER — SENNA PLUS 8.6 MG/1
2 TABLET ORAL AT BEDTIME
Refills: 0 | Status: DISCONTINUED | OUTPATIENT
Start: 2022-04-25 | End: 2022-04-28

## 2022-04-25 RX ORDER — KETOROLAC TROMETHAMINE 30 MG/ML
10 SYRINGE (ML) INJECTION EVERY 6 HOURS
Refills: 0 | Status: DISCONTINUED | OUTPATIENT
Start: 2022-04-25 | End: 2022-04-25

## 2022-04-25 RX ORDER — ACETAMINOPHEN 500 MG
1000 TABLET ORAL EVERY 6 HOURS
Refills: 0 | Status: COMPLETED | OUTPATIENT
Start: 2022-04-25 | End: 2022-04-26

## 2022-04-25 RX ADMIN — Medication 1 APPLICATION(S): at 18:05

## 2022-04-25 RX ADMIN — OXYCODONE HYDROCHLORIDE 5 MILLIGRAM(S): 5 TABLET ORAL at 19:36

## 2022-04-25 RX ADMIN — HEPARIN SODIUM 5000 UNIT(S): 5000 INJECTION INTRAVENOUS; SUBCUTANEOUS at 22:02

## 2022-04-25 RX ADMIN — SODIUM CHLORIDE 75 MILLILITER(S): 9 INJECTION, SOLUTION INTRAVENOUS at 17:17

## 2022-04-25 RX ADMIN — Medication 400 MILLIGRAM(S): at 21:57

## 2022-04-25 RX ADMIN — FENTANYL CITRATE 50 MICROGRAM(S): 50 INJECTION INTRAVENOUS at 17:30

## 2022-04-25 RX ADMIN — Medication 100 MILLIGRAM(S): at 23:43

## 2022-04-25 RX ADMIN — Medication 1000 MILLIGRAM(S): at 22:25

## 2022-04-25 RX ADMIN — FENTANYL CITRATE 50 MICROGRAM(S): 50 INJECTION INTRAVENOUS at 17:13

## 2022-04-25 RX ADMIN — OXYCODONE HYDROCHLORIDE 5 MILLIGRAM(S): 5 TABLET ORAL at 20:00

## 2022-04-25 RX ADMIN — Medication 15 MILLIGRAM(S): at 22:26

## 2022-04-25 RX ADMIN — Medication 15 MILLIGRAM(S): at 22:00

## 2022-04-25 NOTE — BRIEF OPERATIVE NOTE - NSICDXBRIEFPREOP_GEN_ALL_CORE_FT
PRE-OP DIAGNOSIS:  Cancer of breast 25-Apr-2022 12:47:43  Osmany Cartwright  
PRE-OP DIAGNOSIS:  Cancer of breast 25-Apr-2022 12:47:43  Osmany Cartwright

## 2022-04-25 NOTE — BRIEF OPERATIVE NOTE - NSICDXBRIEFPROCEDURE_GEN_ALL_CORE_FT
PROCEDURES:  Skin sparing mastectomy 25-Apr-2022 12:47:23  Osmany Cartwright  
PROCEDURES:  Breast reconstruction with REEMA free flap 25-Apr-2022 16:53:51  Gissel Zafar

## 2022-04-25 NOTE — PROGRESS NOTE ADULT - ASSESSMENT
Ass: 72y Female POD 0 S/P Bilateral Skin sparing mastectomy and Bilateral Breast reconstruction with REEMA free flap   Plan:    DVT prophylaxis/OOB  Encourage Incentive spirometry  Strict I&O's  pain control  AM labs

## 2022-04-25 NOTE — PATIENT PROFILE ADULT - FALL HARM RISK - UNIVERSAL INTERVENTIONS
Bed in lowest position, wheels locked, appropriate side rails in place/Call bell, personal items and telephone in reach/Instruct patient to call for assistance before getting out of bed or chair/Non-slip footwear when patient is out of bed/[place] to call system/Physically safe environment - no spills, clutter or unnecessary equipment/Purposeful Proactive Rounding/Room/bathroom lighting operational, light cord in reach

## 2022-04-25 NOTE — BRIEF OPERATIVE NOTE - OPERATION/FINDINGS
hemostasis achieved, intact breast implants
bilateral skin sparring mastectomy was performed with out complication refer to dr Ibrahim full dictated report, separate operative note to be completed by plastic surgery

## 2022-04-25 NOTE — PROGRESS NOTE ADULT - SUBJECTIVE AND OBJECTIVE BOX
Post-op check:  S/P Bilateral Skin sparing mastectomy and Bilateral Breast reconstruction with REEMA free flap     The pt is a 72yFemale with DCIS of left breast .  Pt seen and examined without complaints. Pain is controlled. Denies SOB/CP/N/V.     acetaminophen     Tablet .. 650 milliGRAM(s) Oral every 6 hours  acetaminophen   IVPB .. 1000 milliGRAM(s) IV Intermittent every 6 hours  BACItracin   Ointment 1 Application(s) Topical three times a day PRN  fentaNYL    Injectable 50 MICROGram(s) IV Push every 5 minutes PRN  heparin   Injectable 5000 Unit(s) SubCutaneous every 8 hours  HYDROmorphone  Injectable 0.5 milliGRAM(s) IV Push every 4 hours PRN  HYDROmorphone  Injectable 0.5 milliGRAM(s) IV Push every 10 minutes PRN  ketorolac   Injectable 15 milliGRAM(s) IV Push every 6 hours  lactated ringers. 1000 milliLiter(s) IV Continuous <Continuous>  lactated ringers. 1000 milliLiter(s) IV Continuous <Continuous>  ondansetron Injectable 4 milliGRAM(s) IV Push once PRN  ondansetron Injectable 4 milliGRAM(s) IV Push every 6 hours PRN  oxyCODONE    IR 5 milliGRAM(s) Oral every 4 hours PRN  oxyCODONE    IR 10 milliGRAM(s) Oral every 4 hours PRN  senna 2 Tablet(s) Oral at bedtime      Vital Signs Last 24 Hrs  T(C): 36.4 (25 Apr 2022 16:56), Max: 36.8 (25 Apr 2022 06:10)  T(F): 97.6 (25 Apr 2022 16:56), Max: 98.3 (25 Apr 2022 06:10)  HR: 85 (25 Apr 2022 21:00) (66 - 85)  BP: 110/50 (25 Apr 2022 21:00) (102/48 - 129/60)  BP(mean): --  RR: 12 (25 Apr 2022 21:00) (10 - 18)  SpO2: 100% (25 Apr 2022 21:00) (97% - 100%)    I&O's Summary    25 Apr 2022 07:01  -  25 Apr 2022 21:44  --------------------------------------------------------  IN: 3930 mL / OUT: 835 mL / NET: 3095 mL    Vioptics:    Right:  StO2=85%  Signal Quality=91  Left:  StO2=89  Signal Quality=93    TAM's total since post-op:    TAM #1 R Breast:  31cc  TAM#2 R Abdomen:  40cc  TAM#3 L Abdomen:  13cc  Tam#4 L Breast:  36cc    UO= 365cc over 4h post-op clear-yellow    Physical Exam  Gen: NAD, comfortable in bed  Lungs: CTAB  Heart: S1/S2, RRR  Breast:  Symmetric bilaterally, no ecchymosis, or hematoma, noted, vioptics in place and stable, right flap overed in guaze, left flap pink, TAM's bilaterally with minimal dark bloody output.  Abd: incisions clean and dry, ~ 8cm lineal superficial skin irritation below incision, TAM's in place with dark bloody output, no ecchymosis or hematoma noted, +BS, soft  :  reyes in place with clear-yellow urine  Neuro: A&Ox3, CNII-XII grossly intact, motor and sensory intact and equal bilat  Extremities: venodynes in place. no c/c/e

## 2022-04-26 DIAGNOSIS — D05.12 INTRADUCTAL CARCINOMA IN SITU OF LEFT BREAST: ICD-10-CM

## 2022-04-26 LAB
ANION GAP SERPL CALC-SCNC: 6 MMOL/L — SIGNIFICANT CHANGE UP (ref 5–17)
BUN SERPL-MCNC: 16 MG/DL — SIGNIFICANT CHANGE UP (ref 7–23)
CALCIUM SERPL-MCNC: 8.4 MG/DL — LOW (ref 8.5–10.1)
CHLORIDE SERPL-SCNC: 108 MMOL/L — SIGNIFICANT CHANGE UP (ref 96–108)
CO2 SERPL-SCNC: 27 MMOL/L — SIGNIFICANT CHANGE UP (ref 22–31)
CREAT SERPL-MCNC: 0.7 MG/DL — SIGNIFICANT CHANGE UP (ref 0.5–1.3)
EGFR: 93 ML/MIN/1.73M2 — SIGNIFICANT CHANGE UP
GLUCOSE SERPL-MCNC: 135 MG/DL — HIGH (ref 70–99)
HCT VFR BLD CALC: 28.2 % — LOW (ref 34.5–45)
HCT VFR BLD CALC: 30.3 % — LOW (ref 34.5–45)
HGB BLD-MCNC: 9.1 G/DL — LOW (ref 11.5–15.5)
HGB BLD-MCNC: 9.8 G/DL — LOW (ref 11.5–15.5)
MCHC RBC-ENTMCNC: 29.8 PG — SIGNIFICANT CHANGE UP (ref 27–34)
MCHC RBC-ENTMCNC: 32.3 GM/DL — SIGNIFICANT CHANGE UP (ref 32–36)
MCV RBC AUTO: 92.1 FL — SIGNIFICANT CHANGE UP (ref 80–100)
PLATELET # BLD AUTO: 257 K/UL — SIGNIFICANT CHANGE UP (ref 150–400)
POTASSIUM SERPL-MCNC: 4.1 MMOL/L — SIGNIFICANT CHANGE UP (ref 3.5–5.3)
POTASSIUM SERPL-SCNC: 4.1 MMOL/L — SIGNIFICANT CHANGE UP (ref 3.5–5.3)
RBC # BLD: 3.29 M/UL — LOW (ref 3.8–5.2)
RBC # FLD: 14.3 % — SIGNIFICANT CHANGE UP (ref 10.3–14.5)
SODIUM SERPL-SCNC: 141 MMOL/L — SIGNIFICANT CHANGE UP (ref 135–145)
WBC # BLD: 15.28 K/UL — HIGH (ref 3.8–10.5)
WBC # FLD AUTO: 15.28 K/UL — HIGH (ref 3.8–10.5)

## 2022-04-26 RX ORDER — ACETAMINOPHEN 500 MG
650 TABLET ORAL EVERY 6 HOURS
Refills: 0 | Status: DISCONTINUED | OUTPATIENT
Start: 2022-04-26 | End: 2022-04-28

## 2022-04-26 RX ORDER — SODIUM CHLORIDE 9 MG/ML
500 INJECTION, SOLUTION INTRAVENOUS ONCE
Refills: 0 | Status: COMPLETED | OUTPATIENT
Start: 2022-04-26 | End: 2022-04-26

## 2022-04-26 RX ORDER — LANOLIN ALCOHOL/MO/W.PET/CERES
5 CREAM (GRAM) TOPICAL AT BEDTIME
Refills: 0 | Status: DISCONTINUED | OUTPATIENT
Start: 2022-04-26 | End: 2022-04-28

## 2022-04-26 RX ADMIN — Medication 15 MILLIGRAM(S): at 12:07

## 2022-04-26 RX ADMIN — SODIUM CHLORIDE 1000 MILLILITER(S): 9 INJECTION, SOLUTION INTRAVENOUS at 11:05

## 2022-04-26 RX ADMIN — HEPARIN SODIUM 5000 UNIT(S): 5000 INJECTION INTRAVENOUS; SUBCUTANEOUS at 21:07

## 2022-04-26 RX ADMIN — Medication 100 MILLIGRAM(S): at 06:42

## 2022-04-26 RX ADMIN — HEPARIN SODIUM 5000 UNIT(S): 5000 INJECTION INTRAVENOUS; SUBCUTANEOUS at 15:02

## 2022-04-26 RX ADMIN — Medication 15 MILLIGRAM(S): at 17:46

## 2022-04-26 RX ADMIN — Medication 400 MILLIGRAM(S): at 18:20

## 2022-04-26 RX ADMIN — Medication 15 MILLIGRAM(S): at 06:07

## 2022-04-26 RX ADMIN — Medication 15 MILLIGRAM(S): at 06:22

## 2022-04-26 RX ADMIN — Medication 400 MILLIGRAM(S): at 06:06

## 2022-04-26 RX ADMIN — Medication 1000 MILLIGRAM(S): at 06:21

## 2022-04-26 RX ADMIN — SODIUM CHLORIDE 75 MILLILITER(S): 9 INJECTION, SOLUTION INTRAVENOUS at 16:11

## 2022-04-26 RX ADMIN — Medication 400 MILLIGRAM(S): at 12:41

## 2022-04-26 RX ADMIN — Medication 5 MILLIGRAM(S): at 22:44

## 2022-04-26 RX ADMIN — SENNA PLUS 2 TABLET(S): 8.6 TABLET ORAL at 21:07

## 2022-04-26 RX ADMIN — Medication 15 MILLIGRAM(S): at 18:16

## 2022-04-26 RX ADMIN — Medication 15 MILLIGRAM(S): at 11:37

## 2022-04-26 RX ADMIN — Medication 1000 MILLIGRAM(S): at 18:50

## 2022-04-26 RX ADMIN — HEPARIN SODIUM 5000 UNIT(S): 5000 INJECTION INTRAVENOUS; SUBCUTANEOUS at 06:07

## 2022-04-26 RX ADMIN — Medication 1000 MILLIGRAM(S): at 13:11

## 2022-04-26 NOTE — PROVIDER CONTACT NOTE (OTHER) - SITUATION
73y/o female s/p b/l mastectomy and REEMA free flap. Swelling noted around left lower abdomen TAM insertion site.
71y/o female s/p b/l mastectomy and REEMA yesterday. Has not yet been out of bed. slight swelling to lateral breasts. BP low, reyes catheter still in place

## 2022-04-26 NOTE — PROVIDER CONTACT NOTE (OTHER) - REASON
Slight swelling to lateral breasts, BP low
Slight increased swelling around left abdomen surgical site

## 2022-04-27 LAB
ANION GAP SERPL CALC-SCNC: 4 MMOL/L — LOW (ref 5–17)
BUN SERPL-MCNC: 12 MG/DL — SIGNIFICANT CHANGE UP (ref 7–23)
CALCIUM SERPL-MCNC: 8.3 MG/DL — LOW (ref 8.5–10.1)
CHLORIDE SERPL-SCNC: 111 MMOL/L — HIGH (ref 96–108)
CO2 SERPL-SCNC: 28 MMOL/L — SIGNIFICANT CHANGE UP (ref 22–31)
CREAT SERPL-MCNC: 0.48 MG/DL — LOW (ref 0.5–1.3)
EGFR: 102 ML/MIN/1.73M2 — SIGNIFICANT CHANGE UP
GLUCOSE SERPL-MCNC: 104 MG/DL — HIGH (ref 70–99)
HCT VFR BLD CALC: 27.2 % — LOW (ref 34.5–45)
HGB BLD-MCNC: 8.5 G/DL — LOW (ref 11.5–15.5)
MAGNESIUM SERPL-MCNC: 2.2 MG/DL — SIGNIFICANT CHANGE UP (ref 1.6–2.6)
MCHC RBC-ENTMCNC: 29.6 PG — SIGNIFICANT CHANGE UP (ref 27–34)
MCHC RBC-ENTMCNC: 31.3 GM/DL — LOW (ref 32–36)
MCV RBC AUTO: 94.8 FL — SIGNIFICANT CHANGE UP (ref 80–100)
PLATELET # BLD AUTO: 199 K/UL — SIGNIFICANT CHANGE UP (ref 150–400)
POTASSIUM SERPL-MCNC: 3.9 MMOL/L — SIGNIFICANT CHANGE UP (ref 3.5–5.3)
POTASSIUM SERPL-SCNC: 3.9 MMOL/L — SIGNIFICANT CHANGE UP (ref 3.5–5.3)
RBC # BLD: 2.87 M/UL — LOW (ref 3.8–5.2)
RBC # FLD: 14.6 % — HIGH (ref 10.3–14.5)
SODIUM SERPL-SCNC: 143 MMOL/L — SIGNIFICANT CHANGE UP (ref 135–145)
WBC # BLD: 11.72 K/UL — HIGH (ref 3.8–10.5)
WBC # FLD AUTO: 11.72 K/UL — HIGH (ref 3.8–10.5)

## 2022-04-27 RX ORDER — LIDOCAINE 4 G/100G
1 CREAM TOPICAL ONCE
Refills: 0 | Status: COMPLETED | OUTPATIENT
Start: 2022-04-27 | End: 2022-04-27

## 2022-04-27 RX ORDER — CALCIUM CARBONATE 500(1250)
1 TABLET ORAL THREE TIMES A DAY
Refills: 0 | Status: DISCONTINUED | OUTPATIENT
Start: 2022-04-27 | End: 2022-04-28

## 2022-04-27 RX ADMIN — Medication 15 MILLIGRAM(S): at 00:12

## 2022-04-27 RX ADMIN — Medication 15 MILLIGRAM(S): at 19:30

## 2022-04-27 RX ADMIN — Medication 15 MILLIGRAM(S): at 17:19

## 2022-04-27 RX ADMIN — HEPARIN SODIUM 5000 UNIT(S): 5000 INJECTION INTRAVENOUS; SUBCUTANEOUS at 05:11

## 2022-04-27 RX ADMIN — HEPARIN SODIUM 5000 UNIT(S): 5000 INJECTION INTRAVENOUS; SUBCUTANEOUS at 21:14

## 2022-04-27 RX ADMIN — ONDANSETRON 4 MILLIGRAM(S): 8 TABLET, FILM COATED ORAL at 09:18

## 2022-04-27 RX ADMIN — Medication 650 MILLIGRAM(S): at 19:30

## 2022-04-27 RX ADMIN — Medication 15 MILLIGRAM(S): at 05:11

## 2022-04-27 RX ADMIN — Medication 650 MILLIGRAM(S): at 05:41

## 2022-04-27 RX ADMIN — Medication 650 MILLIGRAM(S): at 17:19

## 2022-04-27 RX ADMIN — Medication 1 TABLET(S): at 21:14

## 2022-04-27 RX ADMIN — LIDOCAINE 1 PATCH: 4 CREAM TOPICAL at 20:41

## 2022-04-27 RX ADMIN — HEPARIN SODIUM 5000 UNIT(S): 5000 INJECTION INTRAVENOUS; SUBCUTANEOUS at 15:05

## 2022-04-27 RX ADMIN — Medication 650 MILLIGRAM(S): at 01:10

## 2022-04-27 RX ADMIN — Medication 650 MILLIGRAM(S): at 05:11

## 2022-04-27 RX ADMIN — SODIUM CHLORIDE 75 MILLILITER(S): 9 INJECTION, SOLUTION INTRAVENOUS at 06:10

## 2022-04-27 RX ADMIN — Medication 15 MILLIGRAM(S): at 05:41

## 2022-04-27 RX ADMIN — SENNA PLUS 2 TABLET(S): 8.6 TABLET ORAL at 21:13

## 2022-04-27 RX ADMIN — Medication 650 MILLIGRAM(S): at 00:12

## 2022-04-27 RX ADMIN — LIDOCAINE 1 PATCH: 4 CREAM TOPICAL at 17:19

## 2022-04-27 RX ADMIN — Medication 1 TABLET(S): at 11:58

## 2022-04-27 RX ADMIN — Medication 650 MILLIGRAM(S): at 11:57

## 2022-04-27 RX ADMIN — Medication 15 MILLIGRAM(S): at 01:10

## 2022-04-27 RX ADMIN — Medication 15 MILLIGRAM(S): at 11:58

## 2022-04-27 NOTE — PROGRESS NOTE ADULT - SUBJECTIVE AND OBJECTIVE BOX
72y Female with DCIS of left breast evaluated POD 1 s/p S/P Bilateral Skin sparing mastectomy and Bilateral Breast reconstruction with REEMA free flap. Pt presently very nauseated, recieved IVP Zofran just prior to my arrival to evaluate this AM, complaints of muscle spasms to her back which she attributes to laying down in bed for prolonged amount of time. Denies CP, palpitation, SOB, N/V/abdominal pain, breast or armpit swelling.    acetaminophen     Tablet .. 650 milliGRAM(s) Oral every 6 hours  BACItracin   Ointment 1 Application(s) Topical three times a day PRN  heparin   Injectable 5000 Unit(s) SubCutaneous every 8 hours  HYDROmorphone  Injectable 0.5 milliGRAM(s) IV Push every 4 hours PRN  HYDROmorphone  Injectable 0.5 milliGRAM(s) IV Push every 10 minutes PRN  ketorolac   Injectable 15 milliGRAM(s) IV Push every 6 hours  lactated ringers. 1000 milliLiter(s) IV Continuous <Continuous>  melatonin 5 milliGRAM(s) Oral at bedtime PRN  ondansetron Injectable 4 milliGRAM(s) IV Push every 6 hours PRN  oxyCODONE    IR 5 milliGRAM(s) Oral every 4 hours PRN  oxyCODONE    IR 10 milliGRAM(s) Oral every 4 hours PRN  senna 2 Tablet(s) Oral at bedtime      Vital Signs Last 24 Hrs  T(C): 36.8 (27 Apr 2022 08:00), Max: 37.1 (26 Apr 2022 16:05)  T(F): 98.3 (27 Apr 2022 08:00), Max: 98.8 (26 Apr 2022 16:05)  HR: 82 (27 Apr 2022 08:00) (66 - 85)  BP: 119/40 (27 Apr 2022 08:00) (92/56 - 121/39)  BP(mean): 64 (27 Apr 2022 05:51) (64 - 64)  RR: 18 (27 Apr 2022 08:00) (16 - 18)  SpO2: 97% (27 Apr 2022 08:00) (96% - 100%)    I&O's Detail    26 Apr 2022 07:01  -  27 Apr 2022 07:00  --------------------------------------------------------  IN:    Lactated Ringers: 825 mL    Oral Fluid: 200 mL  Total IN: 1025 mL    OUT:    Bulb (mL): 77.5 mL    Bulb (mL): 51 mL    Bulb (mL): 36.5 mL    Bulb (mL): 101.5 mL    Indwelling Catheter - Urethral (mL): 450 mL    Voided (mL): 500 mL  Total OUT: 1216.5 mL    Total NET: -191.5 mL      27 Apr 2022 07:01  -  27 Apr 2022 09:39  --------------------------------------------------------  IN:  Total IN: 0 mL    OUT:    Bulb (mL): 5 mL    Bulb (mL): 3 mL    Bulb (mL): 5 mL    Bulb (mL): 3 mL  Total OUT: 16 mL    Total NET: -16 mL          Physical Exam  Gen: NAD, comfortable in bed  Neuro: A&Ox3  Breasts: Flaps intact, good color, capillary refill bl. No swelling or hematomas appreciated. TAM x          Vioptics: 76% 93SS R breast, 79% 90SS  Lungs: CTAB  CV: S1/S2, RRR  Abd: Soft, ND, lower abdominal incision c/d/i.  +abdominal JPs in place x 4, small amount of serosanguinous drainage in each bulb  Extremities: Venodynes in place. No LE edema bl  MSK: L upper scapular area muscle spasms, gently massaged at bedside, pt felt better afterwards                        8.5    11.72 )-----------( 199      ( 27 Apr 2022 07:17 )             27.2       04-27    143  |  111<H>  |  12  ----------------------------<  104<H>  3.9   |  28  |  0.48<L>    Ca    8.3<L>      27 Apr 2022 07:17  Mg     2.2     04-27         72y Female with DCIS of left breast evaluated POD 1 s/p S/P Bilateral Skin sparing mastectomy and Bilateral Breast reconstruction with REEMA free flap. Pt presently very nauseated, recieved IVP Zofran just prior to my arrival to evaluate this AM, complaints of muscle spasms to her back which she attributes to laying down in bed for prolonged amount of time. Denies CP, palpitation, SOB, N/V/abdominal pain, breast or armpit swelling.    acetaminophen     Tablet .. 650 milliGRAM(s) Oral every 6 hours  BACItracin   Ointment 1 Application(s) Topical three times a day PRN  heparin   Injectable 5000 Unit(s) SubCutaneous every 8 hours  HYDROmorphone  Injectable 0.5 milliGRAM(s) IV Push every 4 hours PRN  HYDROmorphone  Injectable 0.5 milliGRAM(s) IV Push every 10 minutes PRN  ketorolac   Injectable 15 milliGRAM(s) IV Push every 6 hours  lactated ringers. 1000 milliLiter(s) IV Continuous <Continuous>  melatonin 5 milliGRAM(s) Oral at bedtime PRN  ondansetron Injectable 4 milliGRAM(s) IV Push every 6 hours PRN  oxyCODONE    IR 5 milliGRAM(s) Oral every 4 hours PRN  oxyCODONE    IR 10 milliGRAM(s) Oral every 4 hours PRN  senna 2 Tablet(s) Oral at bedtime      Vital Signs Last 24 Hrs  T(C): 36.8 (27 Apr 2022 08:00), Max: 37.1 (26 Apr 2022 16:05)  T(F): 98.3 (27 Apr 2022 08:00), Max: 98.8 (26 Apr 2022 16:05)  HR: 82 (27 Apr 2022 08:00) (66 - 85)  BP: 119/40 (27 Apr 2022 08:00) (92/56 - 121/39)  BP(mean): 64 (27 Apr 2022 05:51) (64 - 64)  RR: 18 (27 Apr 2022 08:00) (16 - 18)  SpO2: 97% (27 Apr 2022 08:00) (96% - 100%)    I&O's Detail    26 Apr 2022 07:01  -  27 Apr 2022 07:00  --------------------------------------------------------  IN:    Lactated Ringers: 825 mL    Oral Fluid: 200 mL  Total IN: 1025 mL    OUT:    Bulb (mL): 77.5 mL    Bulb (mL): 51 mL    Bulb (mL): 36.5 mL    Bulb (mL): 101.5 mL    Indwelling Catheter - Urethral (mL): 450 mL    Voided (mL): 500 mL  Total OUT: 1216.5 mL    Total NET: -191.5 mL      27 Apr 2022 07:01  -  27 Apr 2022 09:39  --------------------------------------------------------  IN:  Total IN: 0 mL    OUT:    Bulb (mL): 5 mL    Bulb (mL): 3 mL    Bulb (mL): 5 mL    Bulb (mL): 3 mL  Total OUT: 16 mL    Total NET: -16 mL          Physical Exam  Gen: NAD, comfortable in bed  Neuro: A&Ox3  Breasts: Flaps intact, good color, capillary refill bl. No swelling or hematomas appreciated. TAM x 4 Vioptics: 76% 93SS R breast, 79% 90SS  Lungs: CTAB  CV: S1/S2, RRR  Abd: Soft, ND, lower abdominal incision c/d/i.  +abdominal JPs in place x 4, small amount of serosanguinous drainage in each bulb  Extremities: Venodynes in place. No LE edema bl  MSK: L upper scapular area muscle spasms, gently massaged at bedside, pt felt better afterwards                        8.5    11.72 )-----------( 199      ( 27 Apr 2022 07:17 )             27.2       04-27    143  |  111<H>  |  12  ----------------------------<  104<H>  3.9   |  28  |  0.48<L>    Ca    8.3<L>      27 Apr 2022 07:17  Mg     2.2     04-27

## 2022-04-27 NOTE — PROGRESS NOTE ADULT - ASSESSMENT
A/P: 72y  Female POD 1  s/p S/P Bilateral Skin sparing mastectomy and Bilateral Breast reconstruction with REEMA free flap  Breast flaps appear viable  Stable Vioptics  ADAT  Strict I&O's  Pain control  DVT prophylaxis/OOB  Encourage Incentive spirometry   A/P: 72y  Female POD 1  s/p S/P Bilateral Skin sparing mastectomy and Bilateral Breast reconstruction with REEMA free flap  H&H significantly decreased from pre-op, will transfuse 2 units PRBC's  Breast flaps appear viable  Stable Vioptics  ADAT  Strict I&O's  Pain control  DVT prophylaxis/OOB  Encourage Incentive spirometry

## 2022-04-28 ENCOUNTER — TRANSCRIPTION ENCOUNTER (OUTPATIENT)
Age: 72
End: 2022-04-28

## 2022-04-28 VITALS
SYSTOLIC BLOOD PRESSURE: 109 MMHG | DIASTOLIC BLOOD PRESSURE: 49 MMHG | OXYGEN SATURATION: 94 % | TEMPERATURE: 98 F | RESPIRATION RATE: 17 BRPM | HEART RATE: 85 BPM

## 2022-04-28 DIAGNOSIS — C50.912 MALIGNANT NEOPLASM OF UNSPECIFIED SITE OF LEFT FEMALE BREAST: ICD-10-CM

## 2022-04-28 LAB
HCT VFR BLD CALC: 31 % — LOW (ref 34.5–45)
HGB BLD-MCNC: 9.9 G/DL — LOW (ref 11.5–15.5)
MCHC RBC-ENTMCNC: 28.9 PG — SIGNIFICANT CHANGE UP (ref 27–34)
MCHC RBC-ENTMCNC: 31.9 GM/DL — LOW (ref 32–36)
MCV RBC AUTO: 90.6 FL — SIGNIFICANT CHANGE UP (ref 80–100)
PLATELET # BLD AUTO: 215 K/UL — SIGNIFICANT CHANGE UP (ref 150–400)
RBC # BLD: 3.42 M/UL — LOW (ref 3.8–5.2)
RBC # FLD: 15.7 % — HIGH (ref 10.3–14.5)
WBC # BLD: 12.37 K/UL — HIGH (ref 3.8–10.5)
WBC # FLD AUTO: 12.37 K/UL — HIGH (ref 3.8–10.5)

## 2022-04-28 PROCEDURE — 99232 SBSQ HOSP IP/OBS MODERATE 35: CPT

## 2022-04-28 RX ADMIN — Medication 650 MILLIGRAM(S): at 05:49

## 2022-04-28 RX ADMIN — Medication 1 TABLET(S): at 05:50

## 2022-04-28 RX ADMIN — OXYCODONE HYDROCHLORIDE 5 MILLIGRAM(S): 5 TABLET ORAL at 05:13

## 2022-04-28 RX ADMIN — Medication 15 MILLIGRAM(S): at 00:21

## 2022-04-28 RX ADMIN — OXYCODONE HYDROCHLORIDE 5 MILLIGRAM(S): 5 TABLET ORAL at 12:18

## 2022-04-28 RX ADMIN — Medication 15 MILLIGRAM(S): at 06:59

## 2022-04-28 RX ADMIN — Medication 650 MILLIGRAM(S): at 06:59

## 2022-04-28 RX ADMIN — Medication 15 MILLIGRAM(S): at 05:50

## 2022-04-28 RX ADMIN — OXYCODONE HYDROCHLORIDE 5 MILLIGRAM(S): 5 TABLET ORAL at 04:44

## 2022-04-28 RX ADMIN — Medication 650 MILLIGRAM(S): at 00:20

## 2022-04-28 RX ADMIN — HEPARIN SODIUM 5000 UNIT(S): 5000 INJECTION INTRAVENOUS; SUBCUTANEOUS at 05:50

## 2022-04-28 RX ADMIN — Medication 650 MILLIGRAM(S): at 01:20

## 2022-04-28 RX ADMIN — LIDOCAINE 1 PATCH: 4 CREAM TOPICAL at 05:03

## 2022-04-28 RX ADMIN — Medication 15 MILLIGRAM(S): at 01:56

## 2022-04-28 NOTE — PROGRESS NOTE ADULT - ASSESSMENT
A/P: 72y  Female POD 2 s/p Bilateral Skin sparing mastectomy and Bilateral Breast reconstruction with REEMA free flap  Breast flaps appear viable  ADAT  Strict I&O's  Pain control  DVT prophylaxis/OOB  Encourage Incentive spirometry  D/w Dr. Ng

## 2022-04-28 NOTE — DISCHARGE NOTE NURSING/CASE MANAGEMENT/SOCIAL WORK - PATIENT PORTAL LINK FT
You can access the FollowMyHealth Patient Portal offered by [facility] by registering at the following website: [email or web address]. By joining [employer]’s FollowMyHealth portal, you will also be able to view your health information using other applications (apps) compatible with our system.

## 2022-04-28 NOTE — DISCHARGE NOTE PROVIDER - HOSPITAL COURSE
hospital stay routine, yesterday patient was feeling dizzy and was given 2 units of blood with improvement in her symptoms

## 2022-04-28 NOTE — DISCHARGE NOTE PROVIDER - NSDCCPTREATMENT_GEN_ALL_CORE_FT
PRINCIPAL PROCEDURE  Procedure: Total mastectomy  Findings and Treatment:       SECONDARY PROCEDURE  Procedure: Breast reconstruction with REEMA free flap  Findings and Treatment:

## 2022-04-28 NOTE — PROGRESS NOTE ADULT - REASON FOR ADMISSION
Bilateral Skin sparing mastectomy and Bilateral Breast reconstruction with REEMA free flap
S/P Bilateral Skin sparing mastectomy and Bilateral Breast reconstruction with REEMA free flap

## 2022-04-28 NOTE — DISCHARGE NOTE PROVIDER - NSDCFUSCHEDAPPT_GEN_ALL_CORE_FT
[facility] Physician [facility]  PlasticSur [address]  Scheduled Appointment: 05/03/2022

## 2022-04-28 NOTE — DISCHARGE NOTE PROVIDER - NSDCFUADDINST_GEN_ALL_CORE_FT
Dressings: Leave the surgical glue tape intact, they will fall off on their own.     Bathing: Do not get dressings wet for 48 hours. do not scrub directly on the incisions. secure drains on a lanyard or shoe lace and wear around neck so they are not dangling down. Leave surgical glue tape intact pat the area dry gently.    Drains: Empty and record the drain amount from each drain separately. Write down Date, Time, and Amount for each drain separately on a piece of paper and bring the paper to the office at your follow up visit.     Activity: No heavy lifting, or strenuous activity, you may walk for exercise    Diet: No change    Meds: You should alternate taking Tylenol and Motrin for pain ever 4-6 hours.  Take Oxycodone for severe pain only.     Follow up: call the office to make a follow up appointment in 1 week

## 2022-04-28 NOTE — PROGRESS NOTE ADULT - SUBJECTIVE AND OBJECTIVE BOX
72y Female with POD 2 s/p Bilateral Skin sparing mastectomy and Bilateral Breast reconstruction with REEMA free flap evaluated this morning doing much better, no longer nauseas, tolerating regular diet, pain well controlled, ambulatory on unit, passing flatus. Pt received 2 units PRBC's with adequate rise in hgb, 8.5-->9.9 post-transfusion, desires to be discharged from hospital. Denies CP, palpitation, SOB, N/V/abdominal pain    acetaminophen     Tablet .. 650 milliGRAM(s) Oral every 6 hours  BACItracin   Ointment 1 Application(s) Topical three times a day PRN  calcium carbonate    500 mG (Tums) Chewable 1 Tablet(s) Chew three times a day  heparin   Injectable 5000 Unit(s) SubCutaneous every 8 hours  HYDROmorphone  Injectable 0.5 milliGRAM(s) IV Push every 4 hours PRN  HYDROmorphone  Injectable 0.5 milliGRAM(s) IV Push every 10 minutes PRN  ketorolac   Injectable 15 milliGRAM(s) IV Push every 6 hours  melatonin 5 milliGRAM(s) Oral at bedtime PRN  ondansetron Injectable 4 milliGRAM(s) IV Push every 6 hours PRN  oxyCODONE    IR 5 milliGRAM(s) Oral every 4 hours PRN  oxyCODONE    IR 10 milliGRAM(s) Oral every 4 hours PRN  senna 2 Tablet(s) Oral at bedtime      Vital Signs Last 24 Hrs  T(C): 36.7 (28 Apr 2022 04:38), Max: 37.3 (28 Apr 2022 00:22)  T(F): 98.1 (28 Apr 2022 04:38), Max: 99.2 (28 Apr 2022 00:22)  HR: 90 (28 Apr 2022 04:38) (84 - 93)  BP: 111/74 (28 Apr 2022 04:38) (104/44 - 119/40)  BP(mean): --  RR: 18 (28 Apr 2022 04:38) (16 - 18)  SpO2: 94% (28 Apr 2022 04:38) (94% - 97%)    I&O's Detail    27 Apr 2022 07:01  -  28 Apr 2022 07:00  --------------------------------------------------------  IN:    Oral Fluid: 240 mL    PRBCs (Packed Red Blood Cells): 282 mL  Total IN: 522 mL    OUT:    Bulb (mL): 69 mL    Bulb (mL): 105 mL    Bulb (mL): 82 mL    Bulb (mL): 75 mL    Voided (mL): 475 mL  Total OUT: 806 mL    Total NET: -284 mL          Physical Exam  Gen: NAD, comfortable in bed  Neuro: A&Ox3  Breasts: Flaps intact, good color, capillary refill bl. No swelling or hematomas appreciated. TAM x 4,  Vioptics removed yesterday   Lungs: CTAB  CV: S1/S2, RRR  Abd: Soft, ND, lower abdominal incision c/d/i.  +abdominal JPs in place   Extremities: Venodynes in place. No LE edema bl                          9.9    12.37 )-----------( 215      ( 28 Apr 2022 06:49 )             31.0       04-27    143  |  111<H>  |  12  ----------------------------<  104<H>  3.9   |  28  |  0.48<L>    Ca    8.3<L>      27 Apr 2022 07:17  Mg     2.2     04-27        A/P: 72y  Female POD   s/p   Breast flaps appear viable  Stable Vioptics  ADAT  Strict I&O's  Pain control  DVT prophylaxis/OOB  Encourage Incentive spirometry

## 2022-04-28 NOTE — DISCHARGE NOTE PROVIDER - CARE PROVIDER_API CALL
Ashu Ng)  Plastic Surgery; Surgery; Surgery of the Hand  [address], [address]  [place] [address]  Phone: [phone]  Fax: [phone]  Scheduled Appointment: 05/03/2022

## 2022-05-03 ENCOUNTER — APPOINTMENT (OUTPATIENT)
Dept: PLASTIC SURGERY | Facility: CLINIC | Age: 72
End: 2022-05-03
Payer: COMMERCIAL

## 2022-05-03 VITALS
TEMPERATURE: 98.3 F | SYSTOLIC BLOOD PRESSURE: 120 MMHG | DIASTOLIC BLOOD PRESSURE: 68 MMHG | RESPIRATION RATE: 16 BRPM | HEIGHT: 62 IN | OXYGEN SATURATION: 96 % | WEIGHT: 164 LBS | BODY MASS INDEX: 30.18 KG/M2 | HEART RATE: 89 BPM

## 2022-05-03 PROCEDURE — 99024 POSTOP FOLLOW-UP VISIT: CPT

## 2022-05-03 NOTE — PHYSICAL EXAM
[NI] : Normal [de-identified] : b/l breast flaps soft and symmetrical\par skin paddles viable \par incisions c/d/i\par drains in place and functioning\par no palpable fluid collections or signs of infection [de-identified] : abdomen soft and non tender\par umbilicus viable\par incisions c/d/i\par no palpable fluid collections or signs of infection\par drains in place and functioning

## 2022-05-03 NOTE — HISTORY OF PRESENT ILLNESS
[FreeTextEntry1] : Ms. ARIK WATSON  is a 71 year  old female  with past medical history of Asthma, lung nodule, who presents with newly diagnosed left DCIS  breast cancer.  Pt was referred to the office by Dr Ibrahim  \par she is now s/p b/l skin sparing mastectomy with reconstruction using REEMA flaps 4/25/22\par pt doing well \par drains \par 1 - 10\par 2 - 10\par 3 - 30\par 4 - 10\par Denies fever, chills, LE pain/edema\par \par

## 2022-05-03 NOTE — ASSESSMENT
[FreeTextEntry1] : 73 yo female s/p b/l breast reconstruction with REEMA flaps 4/25/22\par pt doing well\par monitor for redness, swelling, fever, chills\par no heavy lifting or strenuous activity\par drains 1, 3, 4 removed today, drain 2 left in place due to drainage\par all pt questions answered\par

## 2022-05-04 DIAGNOSIS — D05.12 INTRADUCTAL CARCINOMA IN SITU OF LEFT BREAST: ICD-10-CM

## 2022-05-04 DIAGNOSIS — R42 DIZZINESS AND GIDDINESS: ICD-10-CM

## 2022-05-10 ENCOUNTER — APPOINTMENT (OUTPATIENT)
Dept: SURGERY | Facility: CLINIC | Age: 72
End: 2022-05-10
Payer: COMMERCIAL

## 2022-05-10 ENCOUNTER — APPOINTMENT (OUTPATIENT)
Dept: PLASTIC SURGERY | Facility: CLINIC | Age: 72
End: 2022-05-10
Payer: COMMERCIAL

## 2022-05-10 VITALS
HEART RATE: 84 BPM | BODY MASS INDEX: 28.89 KG/M2 | HEIGHT: 62 IN | WEIGHT: 157 LBS | OXYGEN SATURATION: 98 % | TEMPERATURE: 97.3 F | RESPIRATION RATE: 16 BRPM | DIASTOLIC BLOOD PRESSURE: 74 MMHG | SYSTOLIC BLOOD PRESSURE: 114 MMHG

## 2022-05-10 VITALS
HEIGHT: 62 IN | OXYGEN SATURATION: 94 % | SYSTOLIC BLOOD PRESSURE: 111 MMHG | BODY MASS INDEX: 28.9 KG/M2 | DIASTOLIC BLOOD PRESSURE: 64 MMHG | TEMPERATURE: 97.8 F | WEIGHT: 157.04 LBS | HEART RATE: 84 BPM

## 2022-05-10 PROCEDURE — 99024 POSTOP FOLLOW-UP VISIT: CPT

## 2022-05-10 NOTE — ASSESSMENT
[FreeTextEntry1] : 73 yo female s/p b/l breast reconstruction with REEMA flap 4/25/22\par pt doing well\par drain removed today without difficulty\par monitor for redness, swelling, fever, chills\par no heavy lifting or strenuous activity\par all pt questions answered\par

## 2022-05-10 NOTE — PHYSICAL EXAM
[NI] : Normal [de-identified] : b/l breast flaps soft and symmetrical\par skin paddles viable \par incisions c/d/i\par no palpable fluid collections or signs of infection [de-identified] : abdomen soft and non tender\par umbilicus viable\par incisions c/d/i\par no palpable fluid collections or signs of infection\par drain in place and functioning

## 2022-05-10 NOTE — HISTORY OF PRESENT ILLNESS
[FreeTextEntry1] : Ms. ARIK WATSON  is a 71 year  old female  with past medical history of Asthma, lung nodule, who presents with newly diagnosed left DCIS  breast cancer.  Pt was referred to the office by Dr Ibrahim  \par she is now s/p b/l skin sparing mastectomy with reconstruction using REEMA flaps 4/25/22\par pt doing well \par drains \par 3 - 30\par Denies fever, chills, LE pain/edema\par \par

## 2022-05-15 NOTE — ASSESSMENT
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 Clinical follow up in 3 months\par 2. Follow up with Plastic Surgery\par 3. NO radiation recommended\par 4. NO hormonal therapy recommended

## 2022-05-15 NOTE — HISTORY OF PRESENT ILLNESS
[FreeTextEntry1] : Re: s/p bilateral mastectomy with left SLNB and REEMA reconstruction for DCIS\par \par I had the pleasure of seeing Stacey Rosenberg in the office with her daughter for a post operative visit.  She is a joel 71 yo postmenopausal female who was recently diagnosed with left breast subareolar DCIS.  She had abnormal breast imaging demonstrating calcifications. She is now s/p bilateral mastectomy with left SLNB and REEMA reconstruction. She is healing well and has full range of motion of upper extremities.\par \par We went over resuming activities of daily living.  \par \par Initial pathology from biopsy demonstrated left breast Stage 0 breast cancer (DCIS) grade 2 ER negative [place] negative, however final pathology did not demonstrate residual carcinoma instead it demonstrated ALH and LCIS in bilateral breast.\par \par 4/2022\par Right simple mastectomy: LCIS and ALH, focal\par fibrocystic change with usual ductal hyperplasia. \par Tissue (portion of right breast capsule) dense fibrous and adipose tissue\par Left simple mastectomy: No residual DCIS, LCIS and ALH [place] lymph node (1 and 2) 2 lymph nodes negative for cancer. \par \par We reviewed the above findings and Stacey understands that she does not need radiation or hormonal therapy. She was recommended to follow up in 12 weeks for clinical examination and may follow up with plastic surgery to continue monitoring surgical progress and toward next step of reconstruction planning.\par \par She understands the plan. A copy of the pathology was provided to her and all questions were answered in regards to recovery and understanding pathology.

## 2022-05-15 NOTE — PHYSICAL EXAM
[Normocephalic] : normocephalic [Atraumatic] : atraumatic [EOMI] : extra ocular movement intact [PERRL] : pupils equal, round and reactive to light [Sclera nonicteric] : sclera nonicteric [Supple] : supple [No Supraclavicular Adenopathy] : no supraclavicular adenopathy [Examined in the supine and seated position] : examined in the supine and seated position [No dominant masses] : no dominant masses in right breast  [No dominant masses] : no dominant masses left breast [No Nipple Retraction] : no left nipple retraction [No Nipple Discharge] : no left nipple discharge [No Axillary Lymphadenopathy] : no left axillary lymphadenopathy [No Edema] : no edema [No Rashes] : no rashes [No Ulceration] : no ulceration [de-identified] : Well healing mastectomy and REEMA flap.  [de-identified] : Well healing mastectomy and REEMA flap.

## 2022-05-24 ENCOUNTER — APPOINTMENT (OUTPATIENT)
Dept: PLASTIC SURGERY | Facility: CLINIC | Age: 72
End: 2022-05-24
Payer: COMMERCIAL

## 2022-05-24 VITALS
HEART RATE: 78 BPM | WEIGHT: 162 LBS | OXYGEN SATURATION: 96 % | TEMPERATURE: 96.8 F | HEIGHT: 62 IN | SYSTOLIC BLOOD PRESSURE: 133 MMHG | DIASTOLIC BLOOD PRESSURE: 73 MMHG | RESPIRATION RATE: 14 BRPM | BODY MASS INDEX: 29.81 KG/M2

## 2022-05-24 PROCEDURE — 99024 POSTOP FOLLOW-UP VISIT: CPT

## 2022-05-24 NOTE — HISTORY OF PRESENT ILLNESS
[FreeTextEntry1] : Ms. ARIK WATSON  is a 71 year  old female  with past medical history of Asthma, lung nodule, who presents with newly diagnosed left DCIS  breast cancer.  Pt was referred to the office by Dr Ibrahim  \par she is now s/p b/l skin sparing mastectomy with reconstruction using REEMA flaps 4/25/22\par pt doing well \par Denies fever, chills, LE pain/edema\par pt called yesterday with concerns of drainage from her abdominal incision\par she was told to put gauze on the area after showers, she presents today for evaluation\par \par

## 2022-05-24 NOTE — ASSESSMENT
[FreeTextEntry1] : 71 yo female s/p b/l REEMA flap reconstruction 4/25/22\par pt doing ok\par discussed moist to dry dressings on the umbilicus and the abdominal incision\par plan discussed with Dr Almanzar\par monitor for redness, swelling, fever, chills\par no heavy lifting or strenuous activity\par all pt questions answered\par

## 2022-05-31 ENCOUNTER — APPOINTMENT (OUTPATIENT)
Dept: PLASTIC SURGERY | Facility: CLINIC | Age: 72
End: 2022-05-31
Payer: COMMERCIAL

## 2022-05-31 VITALS
TEMPERATURE: 97.6 F | WEIGHT: 162 LBS | BODY MASS INDEX: 29.81 KG/M2 | OXYGEN SATURATION: 95 % | SYSTOLIC BLOOD PRESSURE: 117 MMHG | HEIGHT: 62 IN | HEART RATE: 82 BPM | RESPIRATION RATE: 14 BRPM | DIASTOLIC BLOOD PRESSURE: 66 MMHG

## 2022-05-31 PROCEDURE — 99024 POSTOP FOLLOW-UP VISIT: CPT

## 2022-05-31 NOTE — ASSESSMENT
[FreeTextEntry1] : 71 yo female s/p b/l REEMA 4/25/22\par discussed to d/c moist to dry dressings and instead apply medi honey daily\par monitor for redness, swelling, fever, chills\par no heavy lifting or strenuous activity\par all pt questions answered\par

## 2022-05-31 NOTE — HISTORY OF PRESENT ILLNESS
[FreeTextEntry1] : Ms. ARIK WATSON  is a 71 year  old female  with past medical history of Asthma, lung nodule, who presents with newly diagnosed left DCIS  breast cancer.  Pt was referred to the office by Dr Ibrahim  \par she is now s/p b/l skin sparing mastectomy with reconstruction using REEMA flaps 4/25/22\par pt doing ok\par Denies fever, chills, LE pain/edema\par has been applying moist to dry dressings daily after showers \par \par \par

## 2022-05-31 NOTE — PHYSICAL EXAM
[NI] : Normal [de-identified] : b/l breast flaps soft and symmetrical\par skin paddles viable \par incisions c/d/i\par no palpable fluid collections or signs of infection [de-identified] : abdomen soft and non tender\par umbilicus with superficial epidermolysis and fibrin\par incisions c/d\par there is an incisional dehiscence measuring 3 cm x 1 cm with fibrin at base\par no palpable fluid collections or signs of infection

## 2022-06-21 ENCOUNTER — APPOINTMENT (OUTPATIENT)
Dept: PLASTIC SURGERY | Facility: CLINIC | Age: 72
End: 2022-06-21
Payer: COMMERCIAL

## 2022-06-21 VITALS
RESPIRATION RATE: 16 BRPM | OXYGEN SATURATION: 97 % | DIASTOLIC BLOOD PRESSURE: 76 MMHG | WEIGHT: 163 LBS | TEMPERATURE: 97.3 F | BODY MASS INDEX: 30 KG/M2 | HEART RATE: 80 BPM | SYSTOLIC BLOOD PRESSURE: 129 MMHG | HEIGHT: 62 IN

## 2022-06-21 PROCEDURE — 99024 POSTOP FOLLOW-UP VISIT: CPT

## 2022-06-21 NOTE — PHYSICAL EXAM
[NI] : Normal [de-identified] : b/l breast flaps soft and symmetrical\par skin paddles viable \par incisions c/d/i\par no palpable fluid collections or signs of infection [de-identified] : abdomen soft and non tender\par umbilicus with superficial epidermolysis and fibrin\par incisions c/d\par there is an incisional dehiscence measuring 2 cm x 1 cm with fibrin at base\par no palpable fluid collections or signs of infection

## 2022-06-21 NOTE — ASSESSMENT
[FreeTextEntry1] : 71 yo female s/p breast reconstruction with REEMA flaps 4/25/22\par pt seen and examined with Dr. Ng today\par siliver nitrate applied to umbilicus and abdominal incision\par continue medi honey\par begin scar massages\par can wear a bra at this time\par all questions answered \par

## 2022-06-21 NOTE — HISTORY OF PRESENT ILLNESS
[FreeTextEntry1] : Ms. ARIK WATSON  is a 71 year  old female  with past medical history of Asthma, lung nodule, who presents with newly diagnosed left DCIS  breast cancer.  Pt was referred to the office by Dr Ibrahim  \par she is now s/p b/l skin sparing mastectomy with reconstruction using REEMA flaps 4/25/22\par pt doing ok\par Denies fever, chills, LE pain/edema\par has been applying medihoney to umbilicus and abdominal incision with little improvement \par \par \par

## 2022-07-05 ENCOUNTER — APPOINTMENT (OUTPATIENT)
Dept: PLASTIC SURGERY | Facility: CLINIC | Age: 72
End: 2022-07-05

## 2022-07-05 VITALS
HEART RATE: 78 BPM | BODY MASS INDEX: 30 KG/M2 | HEIGHT: 62 IN | DIASTOLIC BLOOD PRESSURE: 75 MMHG | SYSTOLIC BLOOD PRESSURE: 108 MMHG | TEMPERATURE: 97 F | OXYGEN SATURATION: 97 % | WEIGHT: 163 LBS | RESPIRATION RATE: 16 BRPM

## 2022-07-05 PROCEDURE — 99024 POSTOP FOLLOW-UP VISIT: CPT

## 2022-07-07 ENCOUNTER — APPOINTMENT (OUTPATIENT)
Dept: SURGERY | Facility: CLINIC | Age: 72
End: 2022-07-07

## 2022-07-07 VITALS
DIASTOLIC BLOOD PRESSURE: 77 MMHG | HEART RATE: 81 BPM | OXYGEN SATURATION: 98 % | WEIGHT: 163 LBS | HEIGHT: 62 IN | SYSTOLIC BLOOD PRESSURE: 116 MMHG | TEMPERATURE: 98 F | BODY MASS INDEX: 30 KG/M2

## 2022-07-07 PROCEDURE — 99214 OFFICE O/P EST MOD 30 MIN: CPT | Mod: 24

## 2022-07-07 NOTE — PHYSICAL EXAM
[Normocephalic] : normocephalic [Atraumatic] : atraumatic [EOMI] : extra ocular movement intact [PERRL] : pupils equal, round and reactive to light [Sclera nonicteric] : sclera nonicteric [Supple] : supple [No Supraclavicular Adenopathy] : no supraclavicular adenopathy [Examined in the supine and seated position] : examined in the supine and seated position [No dominant masses] : no dominant masses in right breast  [No dominant masses] : no dominant masses left breast [No Nipple Retraction] : no left nipple retraction [No Nipple Discharge] : no left nipple discharge [No Axillary Lymphadenopathy] : no left axillary lymphadenopathy [No Edema] : no edema [No Rashes] : no rashes [No Ulceration] : no ulceration [de-identified] : Well healed mastectomy and REEMA flap.  [de-identified] : Well healed mastectomy and REEMA flap.

## 2022-07-07 NOTE — ASSESSMENT
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 Recommend to see Dr. Garcia for therapy.\par 1. Clinical follow up in 6 months\par 2. Follow up with Plastic Surgery Dr Ng\par 3 .Consult with Dr. 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

## 2022-07-07 NOTE — HISTORY OF PRESENT ILLNESS
[FreeTextEntry1] : Re: s/p bilateral mastectomy with left SLNB and REEMA reconstruction for DCIS\par \par I had the pleasure of seeing Stacey Rosenberg in the office with her daughter for a post operative visit.  She is a joel 73 yo postmenopausal female who was recently diagnosed with left breast subareolar DCIS.  She had abnormal breast imaging demonstrating calcifications. She is now s/p bilateral mastectomy with left SLNB and REEMA reconstruction. She is healing well and has full range of motion of upper extremities.\par \par We went over resuming activities of daily living.  \par \par Initial pathology from biopsy demonstrated left breast Stage 0 breast cancer (DCIS) grade 2 ER negative WA negative, however final pathology did not demonstrate residual carcinoma instead it demonstrated ALH and LCIS in bilateral breast.\par \par 4/2022\par Right simple mastectomy: LCIS and ALH, focal\par fibrocystic change with usual ductal hyperplasia. \par Tissue (portion of right breast capsule) dense fibrous and adipose tissue\par Left simple mastectomy: No residual DCIS, LCIS and ALH [place] lymph node (1 and 2) 2 lymph nodes negative for cancer. \par \par We reviewed the above findings and Stacey understands that she does not need radiation or hormonal therapy. Pt is recommended to go to Dr. Garcia for physical therapy and follow up in office in 6 months.\par \par She understands the plan. Surgical incisions are healing well.

## 2022-07-11 NOTE — HISTORY OF PRESENT ILLNESS
[FreeTextEntry1] : Ms. ARIK WATSON  is a 71 year  old female  with past medical history of Asthma, lung nodule, who presents with newly diagnosed left DCIS  breast cancer.  Pt was referred to the office by Dr Ibrahim  \par she is now s/p b/l skin sparing mastectomy with reconstruction using REEMA flaps 4/25/22\par pt doing ok\par Denies fever, chills, LE pain/edema\par she has been doing moist to dry dressings with little improvement \par \par \par

## 2022-07-11 NOTE — ASSESSMENT
[FreeTextEntry1] : 71 yo female s/p b/l REEMA flaps 4/25/22\par umbilicus debrided today in the office\par discussed with patient taking her back to the OR to debride and close the umbilicus wound and eventually perform an umbilicoplasty at a later date, or to continue dressings chagnes to the area knowing it could take weeks to months for the wound to heal.  R/B/A to both was discussed, she would like to continue doing dressing changes for now and will continue to monitor the area\par she will inform us should anything worsen or change\par all questions answered

## 2022-07-11 NOTE — PHYSICAL EXAM
[NI] : Normal [de-identified] : b/l breast flaps soft and symmetrical\par skin paddles viable \par incisions c/d/i\par no palpable fluid collections or signs of infection [de-identified] : abdomen soft and non tender\par umbilicus with superficial epidermolysis and fibrin\par incisions c/d\par there is an incisional dehiscence measuring 2 cm x 1 cm with fibrin at base\par no palpable fluid collections or signs of infection

## 2022-07-12 ENCOUNTER — APPOINTMENT (OUTPATIENT)
Dept: PLASTIC SURGERY | Facility: CLINIC | Age: 72
End: 2022-07-12

## 2022-07-25 ENCOUNTER — OUTPATIENT (OUTPATIENT)
Dept: OUTPATIENT SERVICES | Facility: HOSPITAL | Age: 72
LOS: 1 days | End: 2022-07-25
Payer: MEDICAID

## 2022-07-25 ENCOUNTER — APPOINTMENT (OUTPATIENT)
Dept: CT IMAGING | Facility: CLINIC | Age: 72
End: 2022-07-25

## 2022-07-25 DIAGNOSIS — Z98.82 BREAST IMPLANT STATUS: Chronic | ICD-10-CM

## 2022-07-25 DIAGNOSIS — Z00.8 ENCOUNTER FOR OTHER GENERAL EXAMINATION: ICD-10-CM

## 2022-07-25 DIAGNOSIS — Z98.890 OTHER SPECIFIED POSTPROCEDURAL STATES: Chronic | ICD-10-CM

## 2022-07-25 DIAGNOSIS — Z98.890 OTHER SPECIFIED POSTPROCEDURAL STATES: ICD-10-CM

## 2022-07-25 DIAGNOSIS — Z98.891 HISTORY OF UTERINE SCAR FROM PREVIOUS SURGERY: Chronic | ICD-10-CM

## 2022-07-25 DIAGNOSIS — Z90.49 ACQUIRED ABSENCE OF OTHER SPECIFIED PARTS OF DIGESTIVE TRACT: Chronic | ICD-10-CM

## 2022-07-25 DIAGNOSIS — Z98.51 TUBAL LIGATION STATUS: Chronic | ICD-10-CM

## 2022-07-25 PROCEDURE — 74177 CT ABD & PELVIS W/CONTRAST: CPT

## 2022-07-25 PROCEDURE — 74177 CT ABD & PELVIS W/CONTRAST: CPT | Mod: 26

## 2022-08-09 ENCOUNTER — APPOINTMENT (OUTPATIENT)
Dept: PLASTIC SURGERY | Facility: CLINIC | Age: 72
End: 2022-08-09

## 2022-08-09 VITALS
BODY MASS INDEX: 30 KG/M2 | OXYGEN SATURATION: 95 % | TEMPERATURE: 97.2 F | RESPIRATION RATE: 16 BRPM | HEART RATE: 78 BPM | SYSTOLIC BLOOD PRESSURE: 107 MMHG | WEIGHT: 163 LBS | HEIGHT: 62 IN | DIASTOLIC BLOOD PRESSURE: 70 MMHG

## 2022-08-09 PROCEDURE — 99214 OFFICE O/P EST MOD 30 MIN: CPT

## 2022-08-17 NOTE — ASSESSMENT
[FreeTextEntry1] : 71 yo female s/p REEMA reconstruction on 4/25/22\par I discussed the risks, benefits, and alternatives including the risks of infection, bleeding, seroma, hematoma, asymmetry, nipple necrosis, change/loss of nipple sensation, contour irregularity, breast skin necrosis, delayed wound healing, need for more surgery.  The patient understands these risks, all questions were answered and the pt wishes to proceed with surgery.\par The plan discussed today is b/l revision of reconstructed breast with reduction, lift, nipple reconstruction, trunk and chest wall lipo, dog ear revision.\par all questions answered

## 2022-08-17 NOTE — PHYSICAL EXAM
[NI] : Normal [de-identified] : b/l breast flaps soft and symmetrical\par skin paddles viable \par incisions c/d/i\par no palpable fluid collections or signs of infection [de-identified] : abdomen soft and non tender\par umbilicus healed\par incisions healed\par no palpable fluid collections or signs of infection

## 2022-08-17 NOTE — HISTORY OF PRESENT ILLNESS
[FreeTextEntry1] : Ms. ARIK WATSON  is a 71 year  old female  with past medical history of Asthma, lung nodule, who presents with newly diagnosed left DCIS  breast cancer.  Pt was referred to the office by Dr Ibrahim  \par she is now s/p b/l skin sparing mastectomy with reconstruction using REEMA flaps 4/25/22\par pt doing much better\par Denies fever, chills, LE pain/edema\par her wounds have healed and she would like to discuss her revision\par \par \par \par

## 2022-09-19 ENCOUNTER — OUTPATIENT (OUTPATIENT)
Dept: OUTPATIENT SERVICES | Facility: HOSPITAL | Age: 72
LOS: 1 days | End: 2022-09-19
Payer: COMMERCIAL

## 2022-09-19 VITALS
TEMPERATURE: 98 F | SYSTOLIC BLOOD PRESSURE: 125 MMHG | OXYGEN SATURATION: 98 % | HEART RATE: 72 BPM | HEIGHT: 62 IN | RESPIRATION RATE: 16 BRPM | DIASTOLIC BLOOD PRESSURE: 56 MMHG | WEIGHT: 160.94 LBS

## 2022-09-19 DIAGNOSIS — Z90.49 ACQUIRED ABSENCE OF OTHER SPECIFIED PARTS OF DIGESTIVE TRACT: Chronic | ICD-10-CM

## 2022-09-19 DIAGNOSIS — Z01.818 ENCOUNTER FOR OTHER PREPROCEDURAL EXAMINATION: ICD-10-CM

## 2022-09-19 DIAGNOSIS — Z98.51 TUBAL LIGATION STATUS: Chronic | ICD-10-CM

## 2022-09-19 DIAGNOSIS — Z90.13 ACQUIRED ABSENCE OF BILATERAL BREASTS AND NIPPLES: Chronic | ICD-10-CM

## 2022-09-19 DIAGNOSIS — Z98.82 BREAST IMPLANT STATUS: Chronic | ICD-10-CM

## 2022-09-19 DIAGNOSIS — Z98.890 OTHER SPECIFIED POSTPROCEDURAL STATES: Chronic | ICD-10-CM

## 2022-09-19 DIAGNOSIS — D05.12 INTRADUCTAL CARCINOMA IN SITU OF LEFT BREAST: ICD-10-CM

## 2022-09-19 DIAGNOSIS — Z98.891 HISTORY OF UTERINE SCAR FROM PREVIOUS SURGERY: Chronic | ICD-10-CM

## 2022-09-19 LAB
ANION GAP SERPL CALC-SCNC: 2 MMOL/L — LOW (ref 5–17)
APPEARANCE UR: CLEAR — SIGNIFICANT CHANGE UP
BASOPHILS # BLD AUTO: 0.04 K/UL — SIGNIFICANT CHANGE UP (ref 0–0.2)
BASOPHILS NFR BLD AUTO: 0.6 % — SIGNIFICANT CHANGE UP (ref 0–2)
BILIRUB UR-MCNC: NEGATIVE — SIGNIFICANT CHANGE UP
BUN SERPL-MCNC: 19 MG/DL — SIGNIFICANT CHANGE UP (ref 7–23)
CALCIUM SERPL-MCNC: 9.3 MG/DL — SIGNIFICANT CHANGE UP (ref 8.5–10.1)
CHLORIDE SERPL-SCNC: 109 MMOL/L — HIGH (ref 96–108)
CO2 SERPL-SCNC: 31 MMOL/L — SIGNIFICANT CHANGE UP (ref 22–31)
COLOR SPEC: YELLOW — SIGNIFICANT CHANGE UP
CREAT SERPL-MCNC: 0.62 MG/DL — SIGNIFICANT CHANGE UP (ref 0.5–1.3)
DIFF PNL FLD: ABNORMAL
EGFR: 96 ML/MIN/1.73M2 — SIGNIFICANT CHANGE UP
EOSINOPHIL # BLD AUTO: 0.36 K/UL — SIGNIFICANT CHANGE UP (ref 0–0.5)
EOSINOPHIL NFR BLD AUTO: 5.6 % — SIGNIFICANT CHANGE UP (ref 0–6)
GLUCOSE SERPL-MCNC: 107 MG/DL — HIGH (ref 70–99)
GLUCOSE UR QL: NEGATIVE — SIGNIFICANT CHANGE UP
HCT VFR BLD CALC: 39.9 % — SIGNIFICANT CHANGE UP (ref 34.5–45)
HGB BLD-MCNC: 12.7 G/DL — SIGNIFICANT CHANGE UP (ref 11.5–15.5)
IMM GRANULOCYTES NFR BLD AUTO: 0.3 % — SIGNIFICANT CHANGE UP (ref 0–0.9)
INR BLD: 0.97 RATIO — SIGNIFICANT CHANGE UP (ref 0.88–1.16)
KETONES UR-MCNC: NEGATIVE — SIGNIFICANT CHANGE UP
LEUKOCYTE ESTERASE UR-ACNC: ABNORMAL
LYMPHOCYTES # BLD AUTO: 2.25 K/UL — SIGNIFICANT CHANGE UP (ref 1–3.3)
LYMPHOCYTES # BLD AUTO: 35.1 % — SIGNIFICANT CHANGE UP (ref 13–44)
MCHC RBC-ENTMCNC: 29.3 PG — SIGNIFICANT CHANGE UP (ref 27–34)
MCHC RBC-ENTMCNC: 31.8 GM/DL — LOW (ref 32–36)
MCV RBC AUTO: 91.9 FL — SIGNIFICANT CHANGE UP (ref 80–100)
MONOCYTES # BLD AUTO: 0.52 K/UL — SIGNIFICANT CHANGE UP (ref 0–0.9)
MONOCYTES NFR BLD AUTO: 8.1 % — SIGNIFICANT CHANGE UP (ref 2–14)
NEUTROPHILS # BLD AUTO: 3.22 K/UL — SIGNIFICANT CHANGE UP (ref 1.8–7.4)
NEUTROPHILS NFR BLD AUTO: 50.3 % — SIGNIFICANT CHANGE UP (ref 43–77)
NITRITE UR-MCNC: NEGATIVE — SIGNIFICANT CHANGE UP
PH UR: 5 — SIGNIFICANT CHANGE UP (ref 5–8)
PLATELET # BLD AUTO: 304 K/UL — SIGNIFICANT CHANGE UP (ref 150–400)
POTASSIUM SERPL-MCNC: 4.2 MMOL/L — SIGNIFICANT CHANGE UP (ref 3.5–5.3)
POTASSIUM SERPL-SCNC: 4.2 MMOL/L — SIGNIFICANT CHANGE UP (ref 3.5–5.3)
PROT UR-MCNC: NEGATIVE — SIGNIFICANT CHANGE UP
PROTHROM AB SERPL-ACNC: 11.2 SEC — SIGNIFICANT CHANGE UP (ref 10.5–13.4)
RBC # BLD: 4.34 M/UL — SIGNIFICANT CHANGE UP (ref 3.8–5.2)
RBC # FLD: 13.6 % — SIGNIFICANT CHANGE UP (ref 10.3–14.5)
SODIUM SERPL-SCNC: 142 MMOL/L — SIGNIFICANT CHANGE UP (ref 135–145)
SP GR SPEC: 1.02 — SIGNIFICANT CHANGE UP (ref 1.01–1.02)
UROBILINOGEN FLD QL: NEGATIVE — SIGNIFICANT CHANGE UP
WBC # BLD: 6.41 K/UL — SIGNIFICANT CHANGE UP (ref 3.8–10.5)
WBC # FLD AUTO: 6.41 K/UL — SIGNIFICANT CHANGE UP (ref 3.8–10.5)

## 2022-09-19 PROCEDURE — 99214 OFFICE O/P EST MOD 30 MIN: CPT | Mod: 25

## 2022-09-19 PROCEDURE — 93010 ELECTROCARDIOGRAM REPORT: CPT

## 2022-09-19 PROCEDURE — 86901 BLOOD TYPING SEROLOGIC RH(D): CPT

## 2022-09-19 PROCEDURE — 85610 PROTHROMBIN TIME: CPT

## 2022-09-19 PROCEDURE — 93005 ELECTROCARDIOGRAM TRACING: CPT

## 2022-09-19 PROCEDURE — 86900 BLOOD TYPING SEROLOGIC ABO: CPT

## 2022-09-19 PROCEDURE — 36415 COLL VENOUS BLD VENIPUNCTURE: CPT

## 2022-09-19 PROCEDURE — 80048 BASIC METABOLIC PNL TOTAL CA: CPT

## 2022-09-19 PROCEDURE — 86850 RBC ANTIBODY SCREEN: CPT

## 2022-09-19 PROCEDURE — 81001 URINALYSIS AUTO W/SCOPE: CPT

## 2022-09-19 PROCEDURE — 85025 COMPLETE CBC W/AUTO DIFF WBC: CPT

## 2022-09-19 NOTE — H&P PST ADULT - NS PRO FEM REPRO PAP RESULTS
Referred To Oculoplastics For Closure Text (Leave Blank If You Do Not Want): After obtaining clear surgical margins the patient was sent to oculoplastics for surgical repair.  The patient understands they will receive post-surgical care and follow-up from the referring physician's office. normal unknown

## 2022-09-19 NOTE — H&P PST ADULT - ASSESSMENT
72 y.o WD, WN pleasant female pmh of breast cancer s/p  Bilateral Skin Sparing Mastectomy with Left [place] Lymph Node Biopsy possible Axillary Dissection and Magatrace. Bilateral Breast Reconstruction with REEMA Flaps, Biopsy/Excision of Lymph Nodes, Partial Rib Resection, Suction Assisted Lipectomy, Implantation of Biologic Implant ; she now presents to PST for planned bilateral revision of reconstructed breast, reduction mastopexy nipple reconstruction trunk  and chest liposuction dog ear revision     Plan:  1. PST instructions given ; NPO status/  instructions to be given by ASU   2. Pt instructed to take following meds on day of surgery: none  3. Pt instructed to take routine evening medications unless indicated   4. Stop NSAIDS ( Aspirin Alev Motrin Mobic Diclofenac), herbal supplements , MVI , Vitamin fish oil 7 days prior to surgery  unless   directed by surgeon or cardiologist;   5. Medical Optimization  with Dr Clarke   6. EZ wash instructions given   7. Labs EKG CXR as per surgeon request   8. Pt instructed to self quarantine after Covid test   9. Covid Testing scheduled Pt notified and aware  10. Pt denies covid symptoms shortness of breath fever cough

## 2022-09-19 NOTE — H&P PST ADULT - NSICDXPASTMEDICALHX_GEN_ALL_CORE_FT
PAST MEDICAL HISTORY:  Asthma URI- induced    Breast cancer, left     Breast mass, right     Hyperlipidemia     Pulmonary nodules Left--stable, followed by pulmonary Dr Kyle     PAST MEDICAL HISTORY:  Asthma URI- induced    Breast cancer, left     Breast mass, right     Environmental and seasonal allergies     Hyperlipidemia     Pulmonary nodules Left--stable, followed by pulmonary Dr Kyle     PAST MEDICAL HISTORY:  Asthma URI- induced    Breast cancer, left denies chemo and readiation    Breast mass, right     Environmental and seasonal allergies     Hyperlipidemia     Pulmonary nodules Left--stable, followed by pulmonary Dr Kyle

## 2022-09-19 NOTE — H&P PST ADULT - HISTORY OF PRESENT ILLNESS
72 y.o WD, WN pleasant female presents to PST with hx of a left breast mass. Patient reports pain in breasts for a few months. She followed with her PCP and was sent for a mammogram which revealed a left breast mass. She states biopsy positive for cancer. Patient also reports an abnormality in her right breast. She discussed options with surgeon and now scheduled for Bilateral Skin Sparing Mastectomy with Left [place] Lymph Node Biopsy possible Axillary Dissection and Magatrace. Bilateral Breast Reconstruction with REEMA Flaps, Biopsy/Excision of Lymph Nodes, Partial Rib Resection, Suction Assisted Lipectomy, Implantation of Biologic Implant  72 y.o WD, WN pleasant female pmh of breast cancer s/p  Bilateral Skin Sparing Mastectomy with Left [place] Lymph Node Biopsy possible Axillary Dissection and Magatrace. Bilateral Breast Reconstruction with REEMA Flaps, Biopsy/Excision of Lymph Nodes, Partial Rib Resection, Suction Assisted Lipectomy, Implantation of Biologic Implant ; denies chemo and radiation;  she now presents to PST for planned bilateral revision of reconstructed breast, reduction mastopexy nipple reconstruction trunk  and chest liposuction dog ear revision

## 2022-09-19 NOTE — H&P PST ADULT - NSICDXPASTSURGICALHX_GEN_ALL_CORE_FT
PAST SURGICAL HISTORY:  H/O breast implant Bilateral -->20 yrs ago    H/O  section     H/O colonoscopy 2022    H/O tubal ligation     History of cholecystectomy 20 yrs ago     PAST SURGICAL HISTORY:  H/O bilateral mastectomy REEMA flap 2022    H/O breast implant Bilateral -->20 yrs ago    H/O  section     H/O colonoscopy 2022    H/O tubal ligation     History of cholecystectomy 20 yrs ago

## 2022-09-19 NOTE — H&P PST ADULT - FALL HARM RISK - UNIVERSAL INTERVENTIONS
Bed in lowest position, wheels locked, appropriate side rails in place/Call bell, personal items and telephone in reach/Instruct patient to call for assistance before getting out of bed or chair/Non-slip footwear when patient is out of bed/[place] to call system/Physically safe environment - no spills, clutter or unnecessary equipment/Purposeful Proactive Rounding/Room/bathroom lighting operational, light cord in reach

## 2022-09-19 NOTE — H&P PST ADULT - HEALTH CARE MAINTENANCE
followed by PCP followed by PCP  Pt denies travel out of [facility] for the past 14 days Pt denies  travel internationally for the past 21 days

## 2022-09-20 DIAGNOSIS — D05.12 INTRADUCTAL CARCINOMA IN SITU OF LEFT BREAST: ICD-10-CM

## 2022-09-20 DIAGNOSIS — Z01.818 ENCOUNTER FOR OTHER PREPROCEDURAL EXAMINATION: ICD-10-CM

## 2022-09-23 PROBLEM — C50.912 MALIGNANT NEOPLASM OF UNSPECIFIED SITE OF LEFT FEMALE BREAST: Chronic | Status: ACTIVE | Noted: 2022-04-18

## 2022-09-23 PROBLEM — J30.89 OTHER ALLERGIC RHINITIS: Chronic | Status: ACTIVE | Noted: 2022-09-19

## 2022-09-26 ENCOUNTER — APPOINTMENT (OUTPATIENT)
Dept: PULMONOLOGY | Facility: CLINIC | Age: 72
End: 2022-09-26

## 2022-09-26 VITALS — HEIGHT: 61.5 IN | WEIGHT: 162 LBS | BODY MASS INDEX: 30.19 KG/M2

## 2022-09-26 VITALS
RESPIRATION RATE: 16 BRPM | DIASTOLIC BLOOD PRESSURE: 72 MMHG | OXYGEN SATURATION: 97 % | SYSTOLIC BLOOD PRESSURE: 112 MMHG | HEART RATE: 82 BPM

## 2022-09-26 DIAGNOSIS — Z01.811 ENCOUNTER FOR PREPROCEDURAL RESPIRATORY EXAMINATION: ICD-10-CM

## 2022-09-26 DIAGNOSIS — J45.909 UNSPECIFIED ASTHMA, UNCOMPLICATED: ICD-10-CM

## 2022-09-26 PROCEDURE — 99214 OFFICE O/P EST MOD 30 MIN: CPT | Mod: 25

## 2022-09-26 PROCEDURE — 94010 BREATHING CAPACITY TEST: CPT

## 2022-09-26 RX ORDER — AZITHROMYCIN 250 MG/1
250 TABLET, FILM COATED ORAL
Qty: 6 | Refills: 0 | Status: DISCONTINUED | COMMUNITY
Start: 2022-03-07 | End: 2022-09-26

## 2022-09-26 NOTE — HISTORY OF PRESENT ILLNESS
[FreeTextEntry1] : for reconstructive surgery\par no fever, chills, chests pain\par no sig sputum\par no sig dyspnea\par

## 2022-09-26 NOTE — PHYSICAL EXAM
[General Appearance - Well Developed] : well developed [General Appearance - Well Nourished] : well nourished [Normal Conjunctiva] : the conjunctiva exhibited no abnormalities [Normal Oropharynx] : normal oropharynx [II] : II [Neck Appearance] : the appearance of the neck was normal [Heart Rate And Rhythm] : heart rate and rhythm were normal [Heart Sounds] : normal S1 and S2 [Murmurs] : no murmurs present [Edema] : no peripheral edema present [Respiration, Rhythm And Depth] : normal respiratory rhythm and effort [Exaggerated Use Of Accessory Muscles For Inspiration] : no accessory muscle use [Auscultation Breath Sounds / Voice Sounds] : lungs were clear to auscultation bilaterally [Lungs Percussion] : the lungs were normal to percussion [Abnormal Walk] : normal gait [Nail Clubbing] : no clubbing of the fingernails [Cyanosis, Localized] : no localized cyanosis [No Focal Deficits] : no focal deficits [Oriented To Time, Place, And Person] : oriented to person, place, and time [Impaired Insight] : insight and judgment were intact [Affect] : the affect was normal [Memory Recent] : recent memory was not impaired [] : no rash [FreeTextEntry1] : no chest wall abn

## 2022-09-26 NOTE — DISCUSSION/SUMMARY
[FreeTextEntry1] : asthmatic bronchitis by hx, minute nodule on CT scan 6/19\par CT 6/20 stable nodule x 1.5 yrs c/w benign, CXR 4/22 negative\par  spirometry remains super  normal\par no acute pulmonary complaints\par No pulmonary contraindications to surgical procedure\par mild increased risk of post operative pulmonary complications\par incentive spirometry/ DVT  prophylaxis\par albuterol neb q4-6 hrs prn

## 2022-09-26 NOTE — CONSULT LETTER
[Dear  ___] : Dear  [unfilled], [Consult Letter:] : I had the pleasure of evaluating your patient, [unfilled]. [Please see my note below.] : Please see my note below. [Sincerely,] : Sincerely, [DrMiranda  ___] : Dr. SAUCEDO [FreeTextEntry3] : John Ivan DO [facility]\par Pulmonary Critical Care\par Director Pulmonary Division\par Medical Director Respiratory Therapy\par [facility]\par \par

## 2022-09-28 ENCOUNTER — APPOINTMENT (OUTPATIENT)
Dept: PLASTIC SURGERY | Facility: HOSPITAL | Age: 72
End: 2022-09-28

## 2022-09-28 RX ORDER — SIMVASTATIN 20 MG/1
0 TABLET, FILM COATED ORAL
Qty: 0 | Refills: 0 | DISCHARGE

## 2022-10-04 ENCOUNTER — APPOINTMENT (OUTPATIENT)
Dept: PLASTIC SURGERY | Facility: CLINIC | Age: 72
End: 2022-10-04

## 2022-10-13 ENCOUNTER — OUTPATIENT (OUTPATIENT)
Dept: OUTPATIENT SERVICES | Facility: HOSPITAL | Age: 72
LOS: 1 days | End: 2022-10-13
Payer: COMMERCIAL

## 2022-10-13 VITALS
RESPIRATION RATE: 16 BRPM | WEIGHT: 162.04 LBS | OXYGEN SATURATION: 97 % | HEIGHT: 62 IN | HEART RATE: 71 BPM | DIASTOLIC BLOOD PRESSURE: 59 MMHG | TEMPERATURE: 98 F | SYSTOLIC BLOOD PRESSURE: 113 MMHG

## 2022-10-13 DIAGNOSIS — Z90.49 ACQUIRED ABSENCE OF OTHER SPECIFIED PARTS OF DIGESTIVE TRACT: Chronic | ICD-10-CM

## 2022-10-13 DIAGNOSIS — Z90.13 ACQUIRED ABSENCE OF BILATERAL BREASTS AND NIPPLES: Chronic | ICD-10-CM

## 2022-10-13 DIAGNOSIS — Z98.890 OTHER SPECIFIED POSTPROCEDURAL STATES: Chronic | ICD-10-CM

## 2022-10-13 DIAGNOSIS — D05.12 INTRADUCTAL CARCINOMA IN SITU OF LEFT BREAST: ICD-10-CM

## 2022-10-13 DIAGNOSIS — Z98.51 TUBAL LIGATION STATUS: Chronic | ICD-10-CM

## 2022-10-13 DIAGNOSIS — Z98.891 HISTORY OF UTERINE SCAR FROM PREVIOUS SURGERY: Chronic | ICD-10-CM

## 2022-10-13 DIAGNOSIS — Z98.82 BREAST IMPLANT STATUS: Chronic | ICD-10-CM

## 2022-10-13 DIAGNOSIS — Z01.818 ENCOUNTER FOR OTHER PREPROCEDURAL EXAMINATION: ICD-10-CM

## 2022-10-13 LAB
ANION GAP SERPL CALC-SCNC: 3 MMOL/L — LOW (ref 5–17)
APPEARANCE UR: CLEAR — SIGNIFICANT CHANGE UP
BASOPHILS # BLD AUTO: 0.04 K/UL — SIGNIFICANT CHANGE UP (ref 0–0.2)
BASOPHILS NFR BLD AUTO: 0.5 % — SIGNIFICANT CHANGE UP (ref 0–2)
BILIRUB UR-MCNC: NEGATIVE — SIGNIFICANT CHANGE UP
BUN SERPL-MCNC: 24 MG/DL — HIGH (ref 7–23)
CALCIUM SERPL-MCNC: 9.2 MG/DL — SIGNIFICANT CHANGE UP (ref 8.5–10.1)
CHLORIDE SERPL-SCNC: 108 MMOL/L — SIGNIFICANT CHANGE UP (ref 96–108)
CO2 SERPL-SCNC: 30 MMOL/L — SIGNIFICANT CHANGE UP (ref 22–31)
COLOR SPEC: YELLOW — SIGNIFICANT CHANGE UP
CREAT SERPL-MCNC: 0.66 MG/DL — SIGNIFICANT CHANGE UP (ref 0.5–1.3)
DIFF PNL FLD: ABNORMAL
EGFR: 94 ML/MIN/1.73M2 — SIGNIFICANT CHANGE UP
EOSINOPHIL # BLD AUTO: 0.37 K/UL — SIGNIFICANT CHANGE UP (ref 0–0.5)
EOSINOPHIL NFR BLD AUTO: 4.8 % — SIGNIFICANT CHANGE UP (ref 0–6)
GLUCOSE SERPL-MCNC: 102 MG/DL — HIGH (ref 70–99)
GLUCOSE UR QL: NEGATIVE — SIGNIFICANT CHANGE UP
HCT VFR BLD CALC: 38.5 % — SIGNIFICANT CHANGE UP (ref 34.5–45)
HGB BLD-MCNC: 12.2 G/DL — SIGNIFICANT CHANGE UP (ref 11.5–15.5)
IMM GRANULOCYTES NFR BLD AUTO: 0.3 % — SIGNIFICANT CHANGE UP (ref 0–0.9)
INR BLD: 1.01 RATIO — SIGNIFICANT CHANGE UP (ref 0.88–1.16)
KETONES UR-MCNC: NEGATIVE — SIGNIFICANT CHANGE UP
LEUKOCYTE ESTERASE UR-ACNC: ABNORMAL
LYMPHOCYTES # BLD AUTO: 2.84 K/UL — SIGNIFICANT CHANGE UP (ref 1–3.3)
LYMPHOCYTES # BLD AUTO: 36.9 % — SIGNIFICANT CHANGE UP (ref 13–44)
MCHC RBC-ENTMCNC: 29 PG — SIGNIFICANT CHANGE UP (ref 27–34)
MCHC RBC-ENTMCNC: 31.7 GM/DL — LOW (ref 32–36)
MCV RBC AUTO: 91.4 FL — SIGNIFICANT CHANGE UP (ref 80–100)
MONOCYTES # BLD AUTO: 0.56 K/UL — SIGNIFICANT CHANGE UP (ref 0–0.9)
MONOCYTES NFR BLD AUTO: 7.3 % — SIGNIFICANT CHANGE UP (ref 2–14)
NEUTROPHILS # BLD AUTO: 3.86 K/UL — SIGNIFICANT CHANGE UP (ref 1.8–7.4)
NEUTROPHILS NFR BLD AUTO: 50.2 % — SIGNIFICANT CHANGE UP (ref 43–77)
NITRITE UR-MCNC: NEGATIVE — SIGNIFICANT CHANGE UP
PH UR: 5 — SIGNIFICANT CHANGE UP (ref 5–8)
PLATELET # BLD AUTO: 310 K/UL — SIGNIFICANT CHANGE UP (ref 150–400)
POTASSIUM SERPL-MCNC: 4.2 MMOL/L — SIGNIFICANT CHANGE UP (ref 3.5–5.3)
POTASSIUM SERPL-SCNC: 4.2 MMOL/L — SIGNIFICANT CHANGE UP (ref 3.5–5.3)
PROT UR-MCNC: NEGATIVE — SIGNIFICANT CHANGE UP
PROTHROM AB SERPL-ACNC: 11.7 SEC — SIGNIFICANT CHANGE UP (ref 10.5–13.4)
RBC # BLD: 4.21 M/UL — SIGNIFICANT CHANGE UP (ref 3.8–5.2)
RBC # FLD: 13.8 % — SIGNIFICANT CHANGE UP (ref 10.3–14.5)
SODIUM SERPL-SCNC: 141 MMOL/L — SIGNIFICANT CHANGE UP (ref 135–145)
SP GR SPEC: 1.02 — SIGNIFICANT CHANGE UP (ref 1.01–1.02)
UROBILINOGEN FLD QL: NEGATIVE — SIGNIFICANT CHANGE UP
WBC # BLD: 7.69 K/UL — SIGNIFICANT CHANGE UP (ref 3.8–10.5)
WBC # FLD AUTO: 7.69 K/UL — SIGNIFICANT CHANGE UP (ref 3.8–10.5)

## 2022-10-13 PROCEDURE — 86900 BLOOD TYPING SEROLOGIC ABO: CPT

## 2022-10-13 PROCEDURE — 86901 BLOOD TYPING SEROLOGIC RH(D): CPT

## 2022-10-13 PROCEDURE — 86850 RBC ANTIBODY SCREEN: CPT

## 2022-10-13 PROCEDURE — 81001 URINALYSIS AUTO W/SCOPE: CPT

## 2022-10-13 PROCEDURE — 36415 COLL VENOUS BLD VENIPUNCTURE: CPT

## 2022-10-13 PROCEDURE — 99214 OFFICE O/P EST MOD 30 MIN: CPT | Mod: 25

## 2022-10-13 PROCEDURE — 85025 COMPLETE CBC W/AUTO DIFF WBC: CPT

## 2022-10-13 PROCEDURE — 80048 BASIC METABOLIC PNL TOTAL CA: CPT

## 2022-10-13 PROCEDURE — 85610 PROTHROMBIN TIME: CPT

## 2022-10-13 NOTE — H&P PST ADULT - HISTORY OF PRESENT ILLNESS
72 y.o WD, WN pleasant female pmh of breast cancer s/p  Bilateral Skin Sparing Mastectomy with Left [place] Lymph Node Biopsy possible Axillary Dissection and Magatrace. Bilateral Breast Reconstruction with REEMA Flaps, Biopsy/Excision of Lymph Nodes, Partial Rib Resection, Suction Assisted Lipectomy, Implantation of Biologic Implant ; denies chemo and radiation;  she now presents to PST for planned bilateral revision of reconstructed breast, reduction mastopexy nipple reconstruction trunk  and chest liposuction dog ear revision

## 2022-10-13 NOTE — H&P PST ADULT - NSICDXPASTMEDICALHX_GEN_ALL_CORE_FT
PAST MEDICAL HISTORY:  Asthma URI- induced    Breast cancer, left denies chemo and readiation    Breast mass, right     Environmental and seasonal allergies     Hyperlipidemia     Pulmonary nodules Left--stable, followed by pulmonary Dr Kyle

## 2022-10-13 NOTE — H&P PST ADULT - ASSESSMENT
72 y.o WD, WN pleasant female pmh of breast cancer s/p  Bilateral Skin Sparing Mastectomy with Left [place] Lymph Node Biopsy possible Axillary Dissection and Magatrace. Bilateral Breast Reconstruction with REEMA Flaps, Biopsy/Excision of Lymph Nodes, Partial Rib Resection, Suction Assisted Lipectomy, Implantation of Biologic Implant ; she now presents to PST for planned bilateral revision of reconstructed breast, reduction mastopexy nipple reconstruction trunk  and chest liposuction dog ear revision     Plan:  1. PST instructions given ; NPO status/  instructions to be given by ASU   2. Pt instructed to take following meds on day of surgery: none  3. Pt instructed to take routine evening medications unless indicated   4. Stop NSAIDS ( Aspirin Alev Motrin Mobic Diclofenac), herbal supplements , MVI , Vitamin fish oil 7 days prior to surgery  unless   directed by surgeon or cardiologist;   5. Medical Optimization  with Dr Clarke   6. EZ wash instructions given   7. Labs EKG CXR as per surgeon request   8. Pt instructed to self quarantine after Covid test   9. Covid Testing scheduled Pt notified and aware  10. Pt denies covid symptoms shortness of breath fever cough

## 2022-10-13 NOTE — H&P PST ADULT - NSICDXPASTSURGICALHX_GEN_ALL_CORE_FT
PAST SURGICAL HISTORY:  H/O bilateral mastectomy REEMA flap 2022    H/O breast implant Bilateral -->20 yrs ago    H/O  section     H/O colonoscopy 2022    H/O tubal ligation     History of cholecystectomy 20 yrs ago

## 2022-10-14 DIAGNOSIS — D05.12 INTRADUCTAL CARCINOMA IN SITU OF LEFT BREAST: ICD-10-CM

## 2022-10-14 DIAGNOSIS — Z01.818 ENCOUNTER FOR OTHER PREPROCEDURAL EXAMINATION: ICD-10-CM

## 2022-10-24 ENCOUNTER — OUTPATIENT (OUTPATIENT)
Dept: INPATIENT UNIT | Facility: HOSPITAL | Age: 72
LOS: 1 days | Discharge: HOME | End: 2022-10-24
Payer: COMMERCIAL

## 2022-10-24 ENCOUNTER — TRANSCRIPTION ENCOUNTER (OUTPATIENT)
Age: 72
End: 2022-10-24

## 2022-10-24 ENCOUNTER — APPOINTMENT (OUTPATIENT)
Dept: PLASTIC SURGERY | Facility: HOSPITAL | Age: 72
End: 2022-10-24

## 2022-10-24 ENCOUNTER — RESULT REVIEW (OUTPATIENT)
Age: 72
End: 2022-10-24

## 2022-10-24 VITALS
SYSTOLIC BLOOD PRESSURE: 120 MMHG | HEART RATE: 88 BPM | OXYGEN SATURATION: 100 % | TEMPERATURE: 98 F | RESPIRATION RATE: 15 BRPM | DIASTOLIC BLOOD PRESSURE: 50 MMHG

## 2022-10-24 VITALS
HEIGHT: 62 IN | HEART RATE: 88 BPM | SYSTOLIC BLOOD PRESSURE: 137 MMHG | DIASTOLIC BLOOD PRESSURE: 85 MMHG | WEIGHT: 162.04 LBS | OXYGEN SATURATION: 98 % | RESPIRATION RATE: 16 BRPM | TEMPERATURE: 98 F

## 2022-10-24 DIAGNOSIS — Z88.0 ALLERGY STATUS TO PENICILLIN: ICD-10-CM

## 2022-10-24 DIAGNOSIS — Z98.890 OTHER SPECIFIED POSTPROCEDURAL STATES: ICD-10-CM

## 2022-10-24 DIAGNOSIS — L92.3 FOREIGN BODY GRANULOMA OF THE SKIN AND SUBCUTANEOUS TISSUE: ICD-10-CM

## 2022-10-24 DIAGNOSIS — E78.5 HYPERLIPIDEMIA, UNSPECIFIED: ICD-10-CM

## 2022-10-24 DIAGNOSIS — N65.1 DISPROPORTION OF RECONSTRUCTED BREAST: ICD-10-CM

## 2022-10-24 DIAGNOSIS — Z87.891 PERSONAL HISTORY OF NICOTINE DEPENDENCE: ICD-10-CM

## 2022-10-24 DIAGNOSIS — Z98.82 BREAST IMPLANT STATUS: Chronic | ICD-10-CM

## 2022-10-24 DIAGNOSIS — Z85.3 PERSONAL HISTORY OF MALIGNANT NEOPLASM OF BREAST: ICD-10-CM

## 2022-10-24 DIAGNOSIS — N60.31 FIBROSCLEROSIS OF RIGHT BREAST: ICD-10-CM

## 2022-10-24 DIAGNOSIS — Z90.13 ACQUIRED ABSENCE OF BILATERAL BREASTS AND NIPPLES: Chronic | ICD-10-CM

## 2022-10-24 DIAGNOSIS — Z98.890 OTHER SPECIFIED POSTPROCEDURAL STATES: Chronic | ICD-10-CM

## 2022-10-24 DIAGNOSIS — J45.909 UNSPECIFIED ASTHMA, UNCOMPLICATED: ICD-10-CM

## 2022-10-24 DIAGNOSIS — Z98.891 HISTORY OF UTERINE SCAR FROM PREVIOUS SURGERY: Chronic | ICD-10-CM

## 2022-10-24 DIAGNOSIS — D05.12 INTRADUCTAL CARCINOMA IN SITU OF LEFT BREAST: ICD-10-CM

## 2022-10-24 DIAGNOSIS — Z98.51 TUBAL LIGATION STATUS: Chronic | ICD-10-CM

## 2022-10-24 DIAGNOSIS — Z90.49 ACQUIRED ABSENCE OF OTHER SPECIFIED PARTS OF DIGESTIVE TRACT: Chronic | ICD-10-CM

## 2022-10-24 DIAGNOSIS — E65 LOCALIZED ADIPOSITY: ICD-10-CM

## 2022-10-24 DIAGNOSIS — N65.0 DEFORMITY OF RECONSTRUCTED BREAST: ICD-10-CM

## 2022-10-24 DIAGNOSIS — Z90.13 ACQUIRED ABSENCE OF BILATERAL BREASTS AND NIPPLES: ICD-10-CM

## 2022-10-24 PROCEDURE — 88305 TISSUE EXAM BY PATHOLOGIST: CPT | Mod: 26

## 2022-10-24 PROCEDURE — 19380 REVJ RECONSTRUCTED BREAST: CPT | Mod: 50

## 2022-10-24 PROCEDURE — 88305 TISSUE EXAM BY PATHOLOGIST: CPT

## 2022-10-24 RX ORDER — OXYCODONE HYDROCHLORIDE 5 MG/1
5 TABLET ORAL ONCE
Refills: 0 | Status: DISCONTINUED | OUTPATIENT
Start: 2022-10-24 | End: 2022-10-24

## 2022-10-24 RX ORDER — SIMVASTATIN 20 MG/1
1 TABLET, FILM COATED ORAL
Qty: 0 | Refills: 0 | DISCHARGE

## 2022-10-24 RX ORDER — SODIUM CHLORIDE 9 MG/ML
1000 INJECTION, SOLUTION INTRAVENOUS
Refills: 0 | Status: DISCONTINUED | OUTPATIENT
Start: 2022-10-24 | End: 2022-10-24

## 2022-10-24 RX ORDER — FENTANYL CITRATE 50 UG/ML
25 INJECTION INTRAVENOUS
Refills: 0 | Status: DISCONTINUED | OUTPATIENT
Start: 2022-10-24 | End: 2022-10-24

## 2022-10-24 RX ORDER — FENTANYL CITRATE 50 UG/ML
50 INJECTION INTRAVENOUS
Refills: 0 | Status: DISCONTINUED | OUTPATIENT
Start: 2022-10-24 | End: 2022-10-24

## 2022-10-24 RX ORDER — ACETAMINOPHEN 500 MG
1000 TABLET ORAL ONCE
Refills: 0 | Status: COMPLETED | OUTPATIENT
Start: 2022-10-24 | End: 2022-10-24

## 2022-10-24 RX ORDER — FAMOTIDINE 10 MG/ML
20 INJECTION INTRAVENOUS ONCE
Refills: 0 | Status: COMPLETED | OUTPATIENT
Start: 2022-10-24 | End: 2022-10-24

## 2022-10-24 RX ORDER — ONDANSETRON 8 MG/1
4 TABLET, FILM COATED ORAL ONCE
Refills: 0 | Status: DISCONTINUED | OUTPATIENT
Start: 2022-10-24 | End: 2022-10-24

## 2022-10-24 RX ADMIN — Medication 1000 MILLIGRAM(S): at 15:15

## 2022-10-24 RX ADMIN — SODIUM CHLORIDE 100 MILLILITER(S): 9 INJECTION, SOLUTION INTRAVENOUS at 15:00

## 2022-10-24 RX ADMIN — Medication 400 MILLIGRAM(S): at 15:00

## 2022-10-24 RX ADMIN — FAMOTIDINE 20 MILLIGRAM(S): 10 INJECTION INTRAVENOUS at 11:43

## 2022-10-24 NOTE — ASU DISCHARGE PLAN (ADULT/PEDIATRIC) - NS MD DC FALL RISK RISK
For information on Fall & Injury Prevention, visit: [email or web address] OR  [email or web address] OR  https://www.cdc.gov/steadi/patient.html

## 2022-10-24 NOTE — ASU DISCHARGE PLAN (ADULT/PEDIATRIC) - ASU DC SPECIAL INSTRUCTIONSFT
Dressings: Leave dressings intact and dry for 48 hours, after 48 hours you may remove the outer gauze only. Leave the surgical glue tape or steri strips intact, they will fall off on their own.     Bathing: Do not get dressings wet for 48 hours.  Once outer dressings are removed you may shower, do not scrub directly on the incisions. No dressing is required after shower, pat the area dry gently. Replace surgical bra when finished.    Drains: Empty and record the drain amount from each drain separately. Write down Date, Time, and Amount for each drain separately on a piece of paper and bring the paper to the office at your follow up visit.  Please make sure that after draining you squeeze the bulb prior to capping to make sure the drain is on constant suction.  The bulb should appear flat at all times.     Activity: No heavy lifting, or strenuous activity, you may walk for exercise    Diet: No change    Meds: For the next 48 hours alternate taking 1000mg of Tylenol (acetaminophen) and 400 mg of Ibuprofen every 3 hours.  After the 48 hours you can alternate as needed. Take prescribed Oxycodone for severe breakthrough pain only.  Do not exceed 4000mg of Tylenol or 1200mg of ibuprofen in a 24 hour period.    Follow up: call the office to confirm a follow up appointment in 1 week

## 2022-10-24 NOTE — ASU DISCHARGE PLAN (ADULT/PEDIATRIC) - CARE PROVIDER_API CALL
Ashu Ng)  Plastic Surgery; Surgery; Surgery of the Hand  [address], [address]  [place] [address]  Phone: [phone]  Fax: [phone]  Scheduled Appointment: 11/01/2022

## 2022-10-24 NOTE — ASU PATIENT PROFILE, ADULT - FALL HARM RISK - UNIVERSAL INTERVENTIONS
Bed in lowest position, wheels locked, appropriate side rails in place/Call bell, personal items and telephone in reach/Instruct patient to call for assistance before getting out of bed or chair/Non-slip footwear when patient is out of bed/[place] to call system/Physically safe environment - no spills, clutter or unnecessary equipment/Purposeful Proactive Rounding/Room/bathroom lighting operational, light cord in reach

## 2022-10-25 LAB — SARS-COV-2 N GENE NPH QL NAA+PROBE: NOT DETECTED

## 2022-11-01 ENCOUNTER — APPOINTMENT (OUTPATIENT)
Dept: PLASTIC SURGERY | Facility: CLINIC | Age: 72
End: 2022-11-01

## 2022-11-01 VITALS
OXYGEN SATURATION: 97 % | HEART RATE: 75 BPM | WEIGHT: 162 LBS | BODY MASS INDEX: 30.19 KG/M2 | SYSTOLIC BLOOD PRESSURE: 126 MMHG | DIASTOLIC BLOOD PRESSURE: 73 MMHG | TEMPERATURE: 97.2 F | RESPIRATION RATE: 14 BRPM | HEIGHT: 61.5 IN

## 2022-11-01 PROCEDURE — 99024 POSTOP FOLLOW-UP VISIT: CPT

## 2022-11-03 NOTE — ASSESSMENT
[FreeTextEntry1] : 71 yo female s/p revision of reconstructed breasts 10/24/22\par pt doing ok\par discussed with her that the plan discussed with Dr. Ng was to set the nipple ski paddles in the correct position and during the next revision with nipple reconstruction we can also do fat grafting to her breasts to correct for the asymmetry\par monitor for redness, swelling, fever, chills\par no heavy lifting or strenuous activity\par continue to wear soft, non wire bra\par she is encouraged to call if there are any changes\par all pt questions answered\par

## 2022-11-03 NOTE — HISTORY OF PRESENT ILLNESS
[FreeTextEntry1] : Ms. ARIK WATSON  is a 71 year  old female  with past medical history of Asthma, lung nodule, who presents with newly diagnosed left DCIS  breast cancer.  Pt was referred to the office by Dr Ibrahim  \par s/p b/l skin sparing mastectomy with reconstruction using REEMA flaps 4/25/22\par she is now s/p revision of reconstructed breasts with periareolar skin paddle lift, liposuction to b/l chest wall 10/24/22\par Denies fever, chills, LE pain/edema\par pt doing ok, she is unhappy with her result and feels like her breasts are still asymmetrical\par \par \par \par \par

## 2022-11-03 NOTE — PHYSICAL EXAM
[NI] : Normal [de-identified] : b/l breast flaps soft and symmetrical\par skin paddles viable \par incisions c/d/i\par no palpable fluid collections or signs of infection [de-identified] : abdomen soft and non tender\par umbilicus healed\par incisions healed\par no palpable fluid collections or signs of infection

## 2022-11-08 ENCOUNTER — APPOINTMENT (OUTPATIENT)
Dept: PLASTIC SURGERY | Facility: CLINIC | Age: 72
End: 2022-11-08

## 2022-11-08 VITALS
HEIGHT: 61.5 IN | SYSTOLIC BLOOD PRESSURE: 124 MMHG | RESPIRATION RATE: 16 BRPM | DIASTOLIC BLOOD PRESSURE: 82 MMHG | WEIGHT: 168 LBS | HEART RATE: 77 BPM | OXYGEN SATURATION: 97 % | TEMPERATURE: 96 F | BODY MASS INDEX: 31.31 KG/M2

## 2022-11-08 PROCEDURE — 99024 POSTOP FOLLOW-UP VISIT: CPT

## 2022-11-08 NOTE — ASSESSMENT
[FreeTextEntry1] : 71 yo female s/p revision of reconstructed breasts 10/24/22\par pt doing well\par seen and examined with Dr. Ng\par discussed options for revision once healed \par she is aware \par monitor for redness, swelling, fever, chills\par no heavy lifting or strenuous activity\par continue to wear soft, non wire bra\par she is encouraged to call if there are any changes\par all pt questions answered\par

## 2022-11-08 NOTE — PHYSICAL EXAM
[NI] : Normal [de-identified] : b/l breast flaps soft and symmetrical\par skin paddles viable \par incisions c/d/i\par no palpable fluid collections or signs of infection [de-identified] : abdomen soft and non tender\par umbilicus healed\par incisions healed\par no palpable fluid collections or signs of infection

## 2022-11-08 NOTE — HISTORY OF PRESENT ILLNESS
[FreeTextEntry1] : Ms. ARIK WATSON  is a 71 year  old female  with past medical history of Asthma, lung nodule, who presents with newly diagnosed left DCIS  breast cancer.  Pt was referred to the office by Dr Ibrahim  \par s/p b/l skin sparing mastectomy with reconstruction using REEMA flaps 4/25/22\par she is now s/p revision of reconstructed breasts with periareolar skin paddle lift, liposuction to b/l chest wall 10/24/22\par Denies fever, chills, LE pain/edema\par pt doing ok, she is feeling better about her result \par \par \par \par \par

## 2022-12-06 ENCOUNTER — APPOINTMENT (OUTPATIENT)
Dept: PLASTIC SURGERY | Facility: CLINIC | Age: 72
End: 2022-12-06

## 2022-12-06 VITALS
SYSTOLIC BLOOD PRESSURE: 120 MMHG | TEMPERATURE: 96.7 F | HEIGHT: 61.5 IN | RESPIRATION RATE: 16 BRPM | DIASTOLIC BLOOD PRESSURE: 73 MMHG | WEIGHT: 168 LBS | HEART RATE: 84 BPM | BODY MASS INDEX: 31.31 KG/M2 | OXYGEN SATURATION: 98 %

## 2022-12-06 PROCEDURE — 99024 POSTOP FOLLOW-UP VISIT: CPT

## 2022-12-06 NOTE — HISTORY OF PRESENT ILLNESS
[FreeTextEntry1] : Ms. ARIK WATSON  is a 71 year  old female  with past medical history of Asthma, lung nodule, who presents with newly diagnosed left DCIS  breast cancer.  Pt was referred to the office by Dr Ibrahim  \par s/p b/l skin sparing mastectomy with reconstruction using REEMA flaps 4/25/22\par she is now s/p revision of reconstructed breasts with periareolar skin paddle lift, liposuction to b/l chest wall 10/24/22\par Denies fever, chills, LE pain/edema\par pt doing well\par \par \par \par \par

## 2022-12-06 NOTE — ASSESSMENT
[FreeTextEntry1] : 73 yo female s/p b/l revision of reconstructed breasts 10/24/22\par pt doing well\par will like to proceed with nipple reconstruction, fat grafting, and lipo at a later date\par no restrictions\par she is encouraged to call if there are any changes\par all pt questions answered\par

## 2022-12-06 NOTE — PHYSICAL EXAM
[NI] : Normal [de-identified] : b/l breast flaps soft and symmetrical\par skin paddles viable \par incisions well healed\par no palpable fluid collections or signs of infection [de-identified] : abdomen soft and non tender\par umbilicus healed\par incisions healed\par no palpable fluid collections or signs of infection

## 2022-12-11 NOTE — H&P PST ADULT - FALL HARM RISK - PATIENT NEEDS ASSISTANCE
Pt mother was given discharge instructions, verbalized understanding.  Pt discharged home in no acute distress.    No assistance needed

## 2023-01-11 ENCOUNTER — APPOINTMENT (OUTPATIENT)
Dept: SURGERY | Facility: CLINIC | Age: 73
End: 2023-01-11
Payer: COMMERCIAL

## 2023-01-11 ENCOUNTER — RESULT REVIEW (OUTPATIENT)
Age: 73
End: 2023-01-11

## 2023-01-11 VITALS
BODY MASS INDEX: 31.31 KG/M2 | WEIGHT: 168 LBS | SYSTOLIC BLOOD PRESSURE: 118 MMHG | HEIGHT: 61.5 IN | DIASTOLIC BLOOD PRESSURE: 72 MMHG

## 2023-01-11 PROCEDURE — 99213 OFFICE O/P EST LOW 20 MIN: CPT

## 2023-01-11 NOTE — ADDENDUM
[FreeTextEntry1] : This note was written by Arlene Hayes, acting as the  for Dr. Mcdonough. This note accurately reflects the work and decisions made by Dr. Mcdonough.

## 2023-01-11 NOTE — PHYSICAL EXAM
[Normocephalic] : normocephalic [Atraumatic] : atraumatic [EOMI] : extra ocular movement intact [PERRL] : pupils equal, round and reactive to light [Sclera nonicteric] : sclera nonicteric [Supple] : supple [No Supraclavicular Adenopathy] : no supraclavicular adenopathy [Examined in the supine and seated position] : examined in the supine and seated position [No dominant masses] : no dominant masses in right breast  [No Axillary Lymphadenopathy] : no left axillary lymphadenopathy [No Edema] : no edema [No Rashes] : no rashes [No Ulceration] : no ulceration [No Cervical Adenopathy] : no cervical adenopathy [Symmetrical] : symmetrical [de-identified] : Bilateral breasts and NAC are surgically absent. Flaps well incorporated and incisions well healed  [de-identified] : There is a firm, nodular area in the left breast at 7-8:00, likely represents an area of fat necrosis

## 2023-01-11 NOTE — HISTORY OF PRESENT ILLNESS
[FreeTextEntry1] : Oncology History:\par 1. 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 LCIS and ALH, 0/2 SLN, right breast LCIS, ALH \par \par CARE TEAM:\par Plastics - [facility]\par PMR - Jose \par Med onc - Juan\par Rad onc - [place] \par \par INTERVAL HISTORY:\par Patient presents for 6 month followup. She denies breast complaints. She is not currently doing SBE. She is planning to do a revision with Dr. Ng, but she would like to wait, as she is still very exhausted from the initial procedure. \par \par HPI:\par - Patient initially presented to Dr. Ibrahim in March 2022 for abnormal breast imaging. Stacey has a history of bilateral breast augmentation performed in [place] 20 years ago. She has not since undergone revision.\par \par She recently underwent abnormal breast imaging with mammogram demonstrating an area of subareolar left breast microcalcifications. She underwent biopsy demonstrating grade 2 DCIS Er negative Pr negative. MRI evaluation was performed and demonstrated an area of 2.5 cm suspicious for hematoma at the site of biopsy. No adenopathy was noted. Of note, a right breast nodule was demonstrated on MRI of the breast felt to be a intramammary node with a recommendation for 6 month follow up MRI. She is now s/p bilateral mastectomy with left SLNB and REEMA reconstruction.\par \par IMAGING:\par None recent

## 2023-01-11 NOTE — ASSESSMENT
[FreeTextEntry1] : 72 year old postmenopausal female with  DCIS ER negative ND negative of left breast is now s/p bilateral mastectomy with left SLNB with no residual carcinoma identified and 0/3 sentinel nodes. \par \par We reviewed her CBE. I explained and showed her, the area of palpable abnormality. I explained to her, that this is likely an area of fat necrosis, however, I would like to obtain a breast US to be sure. I will call her with the results. If any biopsies need to be performed, I will let her know, and have her RTC to review the results. Otherwise I will see her in 6 months for her next CBE. \par \par We discussed the importance of self breast awareness, CBE, and the role for MRI/US post-mastectomy. \par \par Patient verbalized understanding for all treatment plans discussed. All of her questions were answered to the best of my ability. She was encouraged to call the office if any questions or concerns or come in sooner if needed.\par \par \par \par PLAN\par 1. Clinical follow up in 6 months\par 2. Follow up with Plastic Surgery Dr Ng\par 3 .US left breast now

## 2023-01-24 ENCOUNTER — APPOINTMENT (OUTPATIENT)
Dept: SURGERY | Facility: CLINIC | Age: 73
End: 2023-01-24

## 2023-02-13 ENCOUNTER — OUTPATIENT (OUTPATIENT)
Dept: OUTPATIENT SERVICES | Facility: HOSPITAL | Age: 73
LOS: 1 days | End: 2023-02-13
Payer: MEDICAID

## 2023-02-13 ENCOUNTER — RESULT REVIEW (OUTPATIENT)
Age: 73
End: 2023-02-13

## 2023-02-13 ENCOUNTER — APPOINTMENT (OUTPATIENT)
Dept: ULTRASOUND IMAGING | Facility: CLINIC | Age: 73
End: 2023-02-13
Payer: COMMERCIAL

## 2023-02-13 DIAGNOSIS — D05.12 INTRADUCTAL CARCINOMA IN SITU OF LEFT BREAST: ICD-10-CM

## 2023-02-13 DIAGNOSIS — Z98.890 OTHER SPECIFIED POSTPROCEDURAL STATES: ICD-10-CM

## 2023-02-13 DIAGNOSIS — Z90.13 ACQUIRED ABSENCE OF BILATERAL BREASTS AND NIPPLES: ICD-10-CM

## 2023-02-13 PROCEDURE — 76642 ULTRASOUND BREAST LIMITED: CPT

## 2023-02-13 PROCEDURE — 76642 ULTRASOUND BREAST LIMITED: CPT | Mod: 26,LT

## 2023-03-08 NOTE — H&P PST ADULT - LIVES WITH, PROFILE
children Estlander Flap (Upper To Lower Lip) Text: The defect of the lower lip was assessed and measured.  Given the location and size of the defect, an Estlander flap was deemed most appropriate. Using a sterile surgical marker, an appropriate Estlander flap was measured and drawn on the upper lip. Local anesthesia was then infiltrated. A scalpel was then used to incise the lateral aspect of the flap, through skin, muscle and mucosa, leaving the flap pedicled medially.  The flap was then rotated and positioned to fill the lower lip defect.  The flap was then sutured into place with a three layer technique, closing the orbicularis oris muscle layer with subcutaneous buried sutures, followed by a mucosal layer and an epidermal layer.

## 2023-06-05 ENCOUNTER — APPOINTMENT (OUTPATIENT)
Dept: SURGERY | Facility: CLINIC | Age: 73
End: 2023-06-05
Payer: COMMERCIAL

## 2023-06-05 ENCOUNTER — RESULT REVIEW (OUTPATIENT)
Age: 73
End: 2023-06-05

## 2023-06-05 VITALS
TEMPERATURE: 97.4 F | DIASTOLIC BLOOD PRESSURE: 76 MMHG | HEART RATE: 79 BPM | SYSTOLIC BLOOD PRESSURE: 137 MMHG | BODY MASS INDEX: 31.5 KG/M2 | HEIGHT: 61.5 IN | WEIGHT: 169 LBS | OXYGEN SATURATION: 97 %

## 2023-06-05 DIAGNOSIS — R92.8 OTHER ABNORMAL AND INCONCLUSIVE FINDINGS ON DIAGNOSTIC IMAGING OF BREAST: ICD-10-CM

## 2023-06-05 DIAGNOSIS — Z90.13 ACQUIRED ABSENCE OF BILATERAL BREASTS AND NIPPLES: ICD-10-CM

## 2023-06-05 DIAGNOSIS — Z98.890 OTHER SPECIFIED POSTPROCEDURAL STATES: ICD-10-CM

## 2023-06-05 PROCEDURE — 99213 OFFICE O/P EST LOW 20 MIN: CPT

## 2023-06-05 NOTE — HISTORY OF PRESENT ILLNESS
[FreeTextEntry1] : Oncology History:\par 1. Left breast DCIS, grade 2, ER/AK negative\par     -s/p bilateral mastectomy with left SLNB and REEMA recon 4/25/22\par     FINAL PATH: Left breast No residual DCIS. LCIS and ALH, 0/2 SLN, right breast LCIS, ALH \par \par CARE TEAM:\par Plastics - [facility]\par PMR - Jose \par Med onc - [facility]\par Rad onc - [facility] \par \par INTERVAL HISTORY:\par (01/11/23) Patient presents for 6 month followup. She denies breast complaints. She is not currently doing SBE. She is planning to do a revision with Dr. Ng, but she would like to wait, as she is still very exhausted from the initial procedure. \par \par (06/05/23): Patient presents for six month follow up. Patient stating pain in the left breast for the past month and also feels "lumps" under right axilla. \par \par \par HPI:\par - Patient initially presented to Dr. Ibrahim in March 2022 for abnormal breast imaging. Stacey has a history of bilateral breast augmentation performed in [place] 20 years ago. She has not since undergone revision.\par \par She recently underwent abnormal breast imaging with mammogram demonstrating an area of subareolar left breast microcalcifications. She underwent biopsy demonstrating grade 2 DCIS Er negative Pr negative. MRI evaluation was performed and demonstrated an area of 2.5 cm suspicious for hematoma at the site of biopsy. No adenopathy was noted. Of note, a right breast nodule was demonstrated on MRI of the breast felt to be a intramammary node with a recommendation for 6 month follow up MRI. She is now s/p bilateral mastectomy with left SLNB and REEMA reconstruction.\par \par IMAGING:\par (02/13/2023) [facility]: Ultrasound Breast Limited Left - Impression - No sonographic evidence of malignancy, left reconstructed breast. Site of palpable concern at the left 8:00 axis, 6 cm from central has an appearance highly suggestive of focal fat necrosis. A similar appearing focus is noted in an adjacent medial location. Interval targeted ultrasound follow up in 6 months time is advised. BI-RADS 3

## 2023-06-05 NOTE — ASSESSMENT
[FreeTextEntry1] : 73 year old postmenopausal female with  DCIS ER negative [place] negative of left breast is now s/p bilateral mastectomy with left SLNB with no residual carcinoma identified and 0/3 sentinel nodes. \par \par CBE with area of palpable abnormality in the left breast, stable. Patient with left breast pain due for ultrasound in August but will obtain now. I will call her with the results.  If any biopsies need to be performed, I will let her know, and have her RTC to review the results. Skin tags noted under right axillae, and appear benign, recommend followup with dermatology as needed, no lymphadenopathy appreciated.  I will see her in 6 months for her next CBE. \par \par Patient verbalized understanding for all treatment plans discussed. All of her questions were answered to the best of my ability. She was encouraged to call the office if any questions or concerns or come in sooner if needed.\par \par \par \par PLAN\par 1. Clinical follow up in 6 months\par 2 .US left breast now

## 2023-06-05 NOTE — PHYSICAL EXAM
[Normocephalic] : normocephalic [Atraumatic] : atraumatic [EOMI] : extra ocular movement intact [PERRL] : pupils equal, round and reactive to light [Sclera nonicteric] : sclera nonicteric [Supple] : supple [No Supraclavicular Adenopathy] : no supraclavicular adenopathy [No Cervical Adenopathy] : no cervical adenopathy [Examined in the supine and seated position] : examined in the supine and seated position [Symmetrical] : symmetrical [No dominant masses] : no dominant masses in right breast  [No Axillary Lymphadenopathy] : no left axillary lymphadenopathy [No Edema] : no edema [No Rashes] : no rashes [No Ulceration] : no ulceration [de-identified] : Bilateral breasts and NAC are surgically absent. Flaps well incorporated and incisions well healed  [de-identified] : right axillae - skin tags noted  [de-identified] : There is a firm, nodular area in the left breast at 7-8:00, likely represents an area of fat necrosis, feels stable from prior exam

## 2023-07-03 ENCOUNTER — OUTPATIENT (OUTPATIENT)
Dept: OUTPATIENT SERVICES | Facility: HOSPITAL | Age: 73
LOS: 1 days | End: 2023-07-03
Payer: COMMERCIAL

## 2023-07-03 ENCOUNTER — APPOINTMENT (OUTPATIENT)
Dept: ULTRASOUND IMAGING | Facility: CLINIC | Age: 73
End: 2023-07-03
Payer: COMMERCIAL

## 2023-07-03 ENCOUNTER — RESULT REVIEW (OUTPATIENT)
Age: 73
End: 2023-07-03

## 2023-07-03 DIAGNOSIS — Z98.51 TUBAL LIGATION STATUS: Chronic | ICD-10-CM

## 2023-07-03 DIAGNOSIS — Z90.13 ACQUIRED ABSENCE OF BILATERAL BREASTS AND NIPPLES: Chronic | ICD-10-CM

## 2023-07-03 DIAGNOSIS — C50.112 MALIGNANT NEOPLASM OF CENTRAL PORTION OF LEFT FEMALE BREAST: ICD-10-CM

## 2023-07-03 DIAGNOSIS — Z98.82 BREAST IMPLANT STATUS: Chronic | ICD-10-CM

## 2023-07-03 DIAGNOSIS — Z98.890 OTHER SPECIFIED POSTPROCEDURAL STATES: Chronic | ICD-10-CM

## 2023-07-03 DIAGNOSIS — Z90.13 ACQUIRED ABSENCE OF BILATERAL BREASTS AND NIPPLES: ICD-10-CM

## 2023-07-03 DIAGNOSIS — Z98.891 HISTORY OF UTERINE SCAR FROM PREVIOUS SURGERY: Chronic | ICD-10-CM

## 2023-07-03 DIAGNOSIS — Z90.49 ACQUIRED ABSENCE OF OTHER SPECIFIED PARTS OF DIGESTIVE TRACT: Chronic | ICD-10-CM

## 2023-07-03 PROCEDURE — 76642 ULTRASOUND BREAST LIMITED: CPT

## 2023-07-03 PROCEDURE — 76642 ULTRASOUND BREAST LIMITED: CPT | Mod: 26,LT

## 2023-07-11 ENCOUNTER — NON-APPOINTMENT (OUTPATIENT)
Age: 73
End: 2023-07-11

## 2023-08-14 ENCOUNTER — APPOINTMENT (OUTPATIENT)
Dept: ULTRASOUND IMAGING | Facility: CLINIC | Age: 73
End: 2023-08-14

## 2023-12-04 ENCOUNTER — APPOINTMENT (OUTPATIENT)
Dept: SURGERY | Facility: CLINIC | Age: 73
End: 2023-12-04

## 2024-02-05 ENCOUNTER — APPOINTMENT (OUTPATIENT)
Dept: SURGERY | Facility: CLINIC | Age: 74
End: 2024-02-05
Payer: COMMERCIAL

## 2024-02-05 VITALS
TEMPERATURE: 98.1 F | BODY MASS INDEX: 32.26 KG/M2 | OXYGEN SATURATION: 97 % | HEART RATE: 76 BPM | HEIGHT: 61.5 IN | SYSTOLIC BLOOD PRESSURE: 134 MMHG | DIASTOLIC BLOOD PRESSURE: 76 MMHG | WEIGHT: 173.06 LBS

## 2024-02-05 DIAGNOSIS — D05.12 INTRADUCTAL CARCINOMA IN SITU OF LEFT BREAST: ICD-10-CM

## 2024-02-05 PROCEDURE — 99213 OFFICE O/P EST LOW 20 MIN: CPT

## 2024-02-05 NOTE — PHYSICAL EXAM
[Normocephalic] : normocephalic [Atraumatic] : atraumatic [EOMI] : extra ocular movement intact [PERRL] : pupils equal, round and reactive to light [Sclera nonicteric] : sclera nonicteric [Supple] : supple [No Supraclavicular Adenopathy] : no supraclavicular adenopathy [No Cervical Adenopathy] : no cervical adenopathy [Examined in the supine and seated position] : examined in the supine and seated position [Symmetrical] : symmetrical [No dominant masses] : no dominant masses in right breast  [No Axillary Lymphadenopathy] : no left axillary lymphadenopathy [No Rashes] : no rashes [No Edema] : no edema [No Ulceration] : no ulceration [de-identified] : Bilateral breasts and NAC are surgically absent. Flaps well incorporated and incisions well healed  [de-identified] : right axillae - skin tags noted  [de-identified] : Palpable areas in 7-8:00 position less prominent than prior visit

## 2024-02-05 NOTE — ASSESSMENT
[FreeTextEntry1] : 73 year old postmenopausal female with DCIS, ER/OK - left breast is s/p bilateral mastectomy with left SLNB with no residual carcinoma identified and 0/3 lymph nodes.   CBE is benign. Next CBE in 6 months. Encouraged to followup with plastics for recon revision.   She was given a script today for [facility] for bra fitting.   Patient verbalized understanding for all treatment plans discussed. All of her questions were answered to the best of my ability. She was encouraged to call the office if any questions or concerns or come in sooner if needed.  Plan: 1. Followup in 6 months 2. Followup plastics 3. [facility]

## 2024-02-05 NOTE — HISTORY OF PRESENT ILLNESS
[FreeTextEntry1] : Oncology History: 1. 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 LCIS and ALH, 0/2 SLN, right breast LCIS, ALH   CARE TEAM: Plastics - [facility] PMR - Jose  Med onc - [facility] Rad onc - [facility]   INTERVAL HISTORY: (01/11/23) Patient presents for 6 month followup. She denies breast complaints. She is not currently doing SBE. She is planning to do a revision with Dr. Ng, but she would like to wait, as she is still very exhausted from the initial procedure.   (06/05/23): Patient presents for six month follow up. Patient stating pain in the left breast for the past month and also feels "lumps" under right axilla.   (02/05/24): Patient presents for 6 month follow up. She is still interested in recon revision/nipple reconstruction and plans to make a followup appt with plastics. Denies other breast issues.    HPI: - Patient initially presented to Dr. Ibrahim in March 2022 for abnormal breast imaging. Stacey has a history of bilateral breast augmentation performed in [place] 20 years ago. She has not since undergone revision.  She recently underwent abnormal breast imaging with mammogram demonstrating an area of subareolar left breast microcalcifications. She underwent biopsy demonstrating grade 2 DCIS Er negative Pr negative. MRI evaluation was performed and demonstrated an area of 2.5 cm suspicious for hematoma at the site of biopsy. No adenopathy was noted. Of note, a right breast nodule was demonstrated on MRI of the breast felt to be a intramammary node with a recommendation for 6 month follow up MRI. She is now s/p bilateral mastectomy with left SLNB and REEMA reconstruction.  IMAGING: (02[facility]13/2023) [facility]: [facility]trasound Breast Limited Left - Impression - No sonographic evidence of malignancy, left reconstructed breast. Site of palpable concern at the left 8:00 axis, 6 cm from central has an appearance highly suggestive of focal fat necrosis. A similar appearing focus is noted in an adjacent medial location. Interval targeted ultrasound follow up in 6 months time is advised. BI-RADS 3   07/03/23: [facility] [place]: Ultrasound Breast Limited Left: Impression - Density C, multiple areas of fat necrosis and oil cysts medial left breast compatible with benign type findings. BI-RADS 2

## 2024-02-19 ENCOUNTER — APPOINTMENT (OUTPATIENT)
Dept: DERMATOLOGY | Facility: CLINIC | Age: 74
End: 2024-02-19

## 2024-03-05 ENCOUNTER — APPOINTMENT (OUTPATIENT)
Dept: ORTHOPEDIC SURGERY | Facility: CLINIC | Age: 74
End: 2024-03-05
Payer: COMMERCIAL

## 2024-03-05 VITALS — WEIGHT: 173 LBS | BODY MASS INDEX: 31.83 KG/M2 | HEIGHT: 62 IN

## 2024-03-05 DIAGNOSIS — Z85.3 PERSONAL HISTORY OF MALIGNANT NEOPLASM OF BREAST: ICD-10-CM

## 2024-03-05 DIAGNOSIS — Z86.39 PERSONAL HISTORY OF OTHER ENDOCRINE, NUTRITIONAL AND METABOLIC DISEASE: ICD-10-CM

## 2024-03-05 PROCEDURE — 20611 DRAIN/INJ JOINT/BURSA W/US: CPT | Mod: RT

## 2024-03-05 PROCEDURE — J3490M: CUSTOM

## 2024-03-05 PROCEDURE — 99203 OFFICE O/P NEW LOW 30 MIN: CPT | Mod: 25

## 2024-03-05 RX ORDER — SULFAMETHOXAZOLE AND TRIMETHOPRIM 800; 160 MG/1; MG/1
800-160 TABLET ORAL TWICE DAILY
Qty: 6 | Refills: 0 | Status: DISCONTINUED | COMMUNITY
Start: 2022-09-20 | End: 2024-03-05

## 2024-03-05 RX ORDER — SULFAMETHOXAZOLE AND TRIMETHOPRIM 800; 160 MG/1; MG/1
800-160 TABLET ORAL TWICE DAILY
Qty: 14 | Refills: 0 | Status: DISCONTINUED | COMMUNITY
Start: 2022-05-19 | End: 2024-03-05

## 2024-03-05 RX ORDER — OXYCODONE 5 MG/1
5 TABLET ORAL
Qty: 10 | Refills: 0 | Status: DISCONTINUED | COMMUNITY
Start: 2022-04-20 | End: 2024-03-05

## 2024-03-05 RX ORDER — OXYCODONE 5 MG/1
5 TABLET ORAL
Qty: 10 | Refills: 0 | Status: DISCONTINUED | COMMUNITY
Start: 2022-09-20 | End: 2024-03-05

## 2024-03-05 RX ORDER — NITROFURANTOIN (MONOHYDRATE/MACROCRYSTALS) 25; 75 MG/1; MG/1
100 CAPSULE ORAL
Qty: 14 | Refills: 0 | Status: DISCONTINUED | COMMUNITY
Start: 2022-09-26 | End: 2024-03-05

## 2024-03-05 NOTE — PROCEDURE
[Large Joint Injection] : Large joint injection [Right] : of the right [Subacromial Space] : subacromial space [Pain] : pain [Inflammation] : inflammation [Betadine] : betadine [Alcohol] : alcohol [Ethyl Chloride sprayed topically] : ethyl chloride sprayed topically [Sterile technique used] : sterile technique used [___ cc    1%] : Lidocaine ~Vcc of 1%  [___ cc    6mg] :  Betamethasone (Celestone) ~Vcc of 6mg [___ cc    0.5%] : Bupivacaine (Marcaine) ~Vcc of 0.5%  [] : Patient tolerated procedure well [Apply ice for 15min out of every hour for the next 12-24 hours as tolerated] : apply ice for 15 minutes out of every hour for the next 12-24 hours as tolerated [Call if redness, pain or fever occur] : call if redness, pain or fever occur [Previous OTC use and PT nontherapeutic] : patient has tried OTC's including aspirin, Ibuprofen, Aleve, etc or prescription NSAIDS, and/or exercises at home and/or physical therapy without satisfactory response [Patient had decreased mobility in the joint] : patient had decreased mobility in the joint [Risks, benefits, alternatives discussed / Verbal consent obtained] : the risks benefits, and alternatives have been discussed, and verbal consent was obtained [Prior failure or difficult injection] : prior failure or difficult injection [All ultrasound images have been permanently captured and stored accordingly in our picture archiving and communication system] : All ultrasound images have been permanently captured and stored accordingly in our picture archiving and communication system

## 2024-03-05 NOTE — DISCUSSION/SUMMARY
[de-identified] : General Dx Discussion The patient was advised of the diagnosis. The natural history of the pathology was explained in full to the patient in layman's terms. All questions were answered. The risks and benefits of surgical and non-surgical treatment alternatives were explained in full to the patient.  Case Discussed. She defers x-rays in our office today. Will give her a prescription to get x-rays done.  CSI today right shoulder and tolerated well. f/u 5-6 weeks and advised to bring in x-rays at that time.    Entered by Denisa REILLY acting as a scribe. Instructions: Dr. Garcia- The documentation recorded by the scribe accurately reflects the service I personally performed and the decisions made by me.

## 2024-03-05 NOTE — PHYSICAL EXAM
[Right] : right shoulder [Standing] : standing [Mild] : mild [] : no ecchymosis [TWNoteComboBox7] : active forward flexion 160 degrees [TWNoteComboBox6] : internal rotation L5 [de-identified] : active abduction 130 degrees

## 2024-03-05 NOTE — HISTORY OF PRESENT ILLNESS
[9] : 9 [7] : 7 [Throbbing] : throbbing [Full time] : Work status: full time [de-identified] : Patient c/o right shoulder pain for the past 7-8 years. No injury or trauma. Recently pain has gotten worse. Pain with lifting and using her arm. She also feels weakness. She was treated with a CSI a few years ago with some relief. She has also been doing HEP.  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

## 2024-03-25 ENCOUNTER — APPOINTMENT (OUTPATIENT)
Dept: DERMATOLOGY | Facility: CLINIC | Age: 74
End: 2024-03-25
Payer: COMMERCIAL

## 2024-03-25 DIAGNOSIS — L21.8 OTHER SEBORRHEIC DERMATITIS: ICD-10-CM

## 2024-03-25 PROCEDURE — 99203 OFFICE O/P NEW LOW 30 MIN: CPT

## 2024-03-25 RX ORDER — MOMETASONE FUROATE 1 MG/ML
0.1 SOLUTION TOPICAL
Qty: 1 | Refills: 1 | Status: ACTIVE | COMMUNITY
Start: 2024-03-25 | End: 1900-01-01

## 2024-03-25 NOTE — HISTORY OF PRESENT ILLNESS
[FreeTextEntry1] : Hair loss and itching of the ears [de-identified] : First visit for 73-year-old  female referred by Dorinda Mcnulty MD, with a lifelong history of the ears, especially the ear canals.   Has worsened over the past year. Previously evaluated by ENT.  Treated with ?  what without improvement.  Patient also complains of a 5-year history of hair loss.   Sees the hair coming out. Previously I saw a dermatologist (Jayesh).  Treated with intralesional shots to the anterior scalp and p.o. biotin.  No improvement seen. Patient washes hair once a week

## 2024-03-25 NOTE — PLAN
[TextEntry] : Discontinue biotin Start minoxidil 1.25 mg p.o. at night Start mometasone solution 0.1% to ears 2-3 times a day as needed  Return 2 months-do full skin exam

## 2024-03-25 NOTE — PHYSICAL EXAM
[Alert] : alert [Oriented x 3] : ~L oriented x 3 [Well Nourished] : well nourished [FreeTextEntry3] : Type II skin  Scalp: Slight underlying erythema and mild scaling Mild to moderate diffuse thinning 0-1 hairs removed per gentle tug diffusely  Ears: No rash seen

## 2024-03-25 NOTE — ASSESSMENT
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  Seborrheic dermatitis as well

## 2024-03-25 NOTE — CONSULT LETTER
[Dear  ___] : Dear  [unfilled], [Consult Letter:] : I had the pleasure of evaluating your patient, [unfilled]. [Consult Closing:] : Thank you very much for allowing me to participate in the care of this patient.  If you have any questions, please do not hesitate to contact me. [FreeTextEntry2] : Dorinda Mcnulty MD [Sincerely,] : Sincerely, [FreeTextEntry1] : She has a long history of pruritus in the ears, possibly secondary to subclinical seborrheic dermatitis.  She also has hair loss consistent with a female pattern alopecia.  Please see attached chart note for further details and treatment plan. [FreeTextEntry3] : Eric Monreal MD [address] [place] [address] Tel ([phone]) Fax ([phone]) Private line ([phone])

## 2024-04-23 ENCOUNTER — APPOINTMENT (OUTPATIENT)
Dept: ORTHOPEDIC SURGERY | Facility: CLINIC | Age: 74
End: 2024-04-23
Payer: COMMERCIAL

## 2024-04-23 VITALS — HEIGHT: 62 IN | WEIGHT: 173 LBS | BODY MASS INDEX: 31.83 KG/M2

## 2024-04-23 DIAGNOSIS — M75.111 INCOMPLETE ROTATOR CUFF TEAR OR RUPTURE OF RIGHT SHOULDER, NOT SPECIFIED AS TRAUMATIC: ICD-10-CM

## 2024-04-23 DIAGNOSIS — M75.51 BURSITIS OF RIGHT SHOULDER: ICD-10-CM

## 2024-04-23 PROCEDURE — 99213 OFFICE O/P EST LOW 20 MIN: CPT

## 2024-04-23 RX ORDER — MELOXICAM 7.5 MG/1
7.5 TABLET ORAL DAILY
Qty: 30 | Refills: 0 | Status: COMPLETED | COMMUNITY
Start: 2024-04-23 | End: 2024-05-23

## 2024-04-23 NOTE — DISCUSSION/SUMMARY
[de-identified] : General Dx Discussion The patient was advised of the diagnosis. The natural history of the pathology was explained in full to the patient in layman's terms. All questions were answered. The risks and benefits of surgical and non-surgical treatment alternatives were explained in full to the patient.  Case Discussed. xray reviewed no fx AC OA  due to cont pain even after injection and medication she will get a mri to eval for RCT f/u after mri  questions answered

## 2024-04-23 NOTE — PHYSICAL EXAM
[Right] : right shoulder [Standing] : standing [Mild] : mild [] : no ecchymosis [5 ___] : forward flexion 5[unfilled]/5 [5___] : internal rotation 5[unfilled]/5 [TWNoteComboBox7] : active forward flexion 140 degrees [de-identified] : active abduction 110 degrees [TWNoteComboBox6] : internal rotation L5 [de-identified] : external rotation 65 degrees

## 2024-04-23 NOTE — HISTORY OF PRESENT ILLNESS
[6] : 6 [Radiating] : radiating [Shooting] : shooting [Sleep] : sleep [Nothing helps with pain getting better] : Nothing helps with pain getting better [Lying in bed] : lying in bed [Full time] : Work status: full time [] : no [FreeTextEntry1] : rt shoulder [FreeTextEntry6] : sometimes numb [FreeTextEntry7] : arm pain [de-identified] : cortisone made pain worse

## 2024-05-28 ENCOUNTER — APPOINTMENT (OUTPATIENT)
Dept: ORTHOPEDIC SURGERY | Facility: CLINIC | Age: 74
End: 2024-05-28

## 2024-06-18 ENCOUNTER — APPOINTMENT (OUTPATIENT)
Dept: ORTHOPEDIC SURGERY | Facility: CLINIC | Age: 74
End: 2024-06-18
Payer: COMMERCIAL

## 2024-06-18 VITALS — HEIGHT: 62 IN | BODY MASS INDEX: 31.83 KG/M2 | WEIGHT: 173 LBS

## 2024-06-18 DIAGNOSIS — M65.9 SYNOVITIS AND TENOSYNOVITIS, UNSPECIFIED: ICD-10-CM

## 2024-06-18 DIAGNOSIS — M75.41 IMPINGEMENT SYNDROME OF RIGHT SHOULDER: ICD-10-CM

## 2024-06-18 DIAGNOSIS — S43.431D SUPERIOR GLENOID LABRUM LESION OF RIGHT SHOULDER, SUBSEQUENT ENCOUNTER: ICD-10-CM

## 2024-06-18 DIAGNOSIS — M75.121 COMPLETE ROTATOR CUFF TEAR OR RUPTURE OF RIGHT SHOULDER, NOT SPECIFIED AS TRAUMATIC: ICD-10-CM

## 2024-06-18 PROCEDURE — 99214 OFFICE O/P EST MOD 30 MIN: CPT

## 2024-06-19 PROBLEM — S43.431D SUPERIOR GLENOID LABRUM LESION OF RIGHT SHOULDER, SUBSEQUENT ENCOUNTER: Status: ACTIVE | Noted: 2024-06-19

## 2024-06-19 PROBLEM — M65.9 SYNOVITIS OF SHOULDER: Status: ACTIVE | Noted: 2024-06-19

## 2024-06-19 PROBLEM — M75.41 IMPINGEMENT SYNDROME OF RIGHT SHOULDER: Status: ACTIVE | Noted: 2024-06-19

## 2024-06-19 NOTE — PHYSICAL EXAM
[Right] : right shoulder [Standing] : standing [Mild] : mild [5 ___] : forward flexion 5[unfilled]/5 [5___] : internal rotation 5[unfilled]/5 [] : positive impingement testing [TWNoteComboBox7] : active forward flexion 120 degrees [de-identified] : active abduction 95 degrees [TWNoteComboBox6] : internal rotation L5 [de-identified] : external rotation 65 degrees

## 2024-06-19 NOTE — HISTORY OF PRESENT ILLNESS
[6] : 6 [Burning] : burning [Radiating] : radiating [Sleep] : sleep [Meds] : meds [Lying in bed] : lying in bed [Full time] : Work status: full time [de-identified] : Here for f/u right shoulder mri results. She continues to have pain. Some relief from Mobic. Minimal relief after CSI on 3/5/24.  [] : no [FreeTextEntry1] : rt shoulder [FreeTextEntry7] : down arm [de-identified] : MRI R

## 2024-06-19 NOTE — DISCUSSION/SUMMARY
[de-identified] : General Dx Discussion The patient was advised of the diagnosis. The natural history of the pathology was explained in full to the patient in layman's terms. All questions were answered. The risks and benefits of surgical and non-surgical treatment alternatives were explained in full to the patient.  Case Discussed. MRI reviewed, has with pain and loss of motion  Arthroscopy with decompression, debridement RCR discussed Risks, benefits and alternatives discussed. Risks include, but are not limited to infection, blood clot, nerve damage, recurrent tear, loss of motion, continued pain, worsened pain, need for another surgery in the future. Recurrent tear discussed  Discussed that the surgery will not address any pain that she has from any OA she may have. She understands he will not be 100% better after surgery. Prolonged immobilization and rehabilitation discussed. Work limitations discussed. Questions answered. She expressed understanding and would like to proceed.  The patient was advised of the diagnosis.  The natural history of the pathology was explained in full to the patient in layman's terms. All questions were answered.  The risks and benefits of surgical and non-surgical treatment alternatives were explained in full to the patient.   The patient demonstrated a full understanding of the surgical and non-surgical options.  The risks of surgery were outlined in full to the patient including but not limited to bleeding, scarring, infection, sepsis, neurologic injury, vascular injury, failure to resolve symptoms, symptom recurrence, the need for further surgery, non-healing, wound breakdown, deep vein thrombosis, pulmonary embolism, spontaneous osteonecrosis, anesthesia complications and even death.  The patient understood all the risks and accepted them and understood that other complications could occur that are not mentioned above.  The intraoperative plan, post-operative plan, post-operative expectations and limitations were explained in full.  Expectations from non-surgical treatment were explained in full as well.  The patient demonstrated a complete understanding of the treatment alternatives and requested the above-mentioned procedure.  This will be scheduled accordingly.

## 2024-06-19 NOTE — DATA REVIEWED
[MRI] : MRI [Right] : of the right [Shoulder] : shoulder [Report was reviewed and noted in the chart] : The report was reviewed and noted in the chart [FreeTextEntry1] : severe supraspinatous tendinitis with moderate to high grade partial thickness near full width articular surface tear and focal full thickness perforation at the distal posterior footprint. mild gh oa.

## 2024-06-24 ENCOUNTER — APPOINTMENT (OUTPATIENT)
Dept: DERMATOLOGY | Facility: CLINIC | Age: 74
End: 2024-06-24
Payer: COMMERCIAL

## 2024-06-24 DIAGNOSIS — L65.8 OTHER SPECIFIED NONSCARRING HAIR LOSS: ICD-10-CM

## 2024-06-24 DIAGNOSIS — L29.9 PRURITUS, UNSPECIFIED: ICD-10-CM

## 2024-06-24 PROCEDURE — 99213 OFFICE O/P EST LOW 20 MIN: CPT

## 2024-06-24 RX ORDER — MINOXIDIL 2.5 MG/1
2.5 TABLET ORAL
Qty: 45 | Refills: 1 | Status: ACTIVE | COMMUNITY
Start: 2024-03-25 | End: 1900-01-01

## 2024-06-24 RX ORDER — TRIAMCINOLONE ACETONIDE 1 MG/G
0.1 CREAM TOPICAL
Qty: 1 | Refills: 1 | Status: ACTIVE | COMMUNITY
Start: 2024-06-24 | End: 1900-01-01

## 2024-06-24 NOTE — H&P PST ADULT - MALLAMPATI CLASS
Medication: atorvastatin (LIPITOR) 80 MG tablet  passed protocol.   Last office visit date: 11/20/2023  Next appointment scheduled?: Yes   Number of refills given: 3     Class I (easy) - visualization of the soft palate, fauces, uvula, and both anterior and posterior pillars Class III - visualization of the soft palate and the base of the uvula

## 2024-08-26 ENCOUNTER — APPOINTMENT (OUTPATIENT)
Dept: DERMATOLOGY | Facility: CLINIC | Age: 74
End: 2024-08-26

## 2024-09-16 ENCOUNTER — APPOINTMENT (OUTPATIENT)
Dept: SURGERY | Facility: CLINIC | Age: 74
End: 2024-09-16

## 2024-10-10 ENCOUNTER — EMERGENCY (EMERGENCY)
Facility: HOSPITAL | Age: 74
LOS: 1 days | Discharge: HOME | End: 2024-10-10
Attending: EMERGENCY MEDICINE
Payer: SELF-PAY

## 2024-10-10 VITALS
HEIGHT: 62 IN | RESPIRATION RATE: 20 BRPM | OXYGEN SATURATION: 97 % | SYSTOLIC BLOOD PRESSURE: 156 MMHG | DIASTOLIC BLOOD PRESSURE: 70 MMHG | HEART RATE: 97 BPM | TEMPERATURE: 98 F | WEIGHT: 169.09 LBS

## 2024-10-10 VITALS
HEART RATE: 87 BPM | TEMPERATURE: 98 F | DIASTOLIC BLOOD PRESSURE: 76 MMHG | OXYGEN SATURATION: 96 % | RESPIRATION RATE: 18 BRPM | SYSTOLIC BLOOD PRESSURE: 127 MMHG

## 2024-10-10 DIAGNOSIS — Z98.51 TUBAL LIGATION STATUS: Chronic | ICD-10-CM

## 2024-10-10 DIAGNOSIS — Z90.13 ACQUIRED ABSENCE OF BILATERAL BREASTS AND NIPPLES: Chronic | ICD-10-CM

## 2024-10-10 DIAGNOSIS — Z98.890 OTHER SPECIFIED POSTPROCEDURAL STATES: Chronic | ICD-10-CM

## 2024-10-10 DIAGNOSIS — Z98.82 BREAST IMPLANT STATUS: Chronic | ICD-10-CM

## 2024-10-10 DIAGNOSIS — Z98.891 HISTORY OF UTERINE SCAR FROM PREVIOUS SURGERY: Chronic | ICD-10-CM

## 2024-10-10 DIAGNOSIS — Z90.49 ACQUIRED ABSENCE OF OTHER SPECIFIED PARTS OF DIGESTIVE TRACT: Chronic | ICD-10-CM

## 2024-10-10 LAB
ALBUMIN SERPL ELPH-MCNC: 3.9 G/DL — SIGNIFICANT CHANGE UP (ref 3.3–5.2)
ALP SERPL-CCNC: 99 U/L — SIGNIFICANT CHANGE UP (ref 40–120)
ALT FLD-CCNC: 9 U/L — SIGNIFICANT CHANGE UP
ANION GAP SERPL CALC-SCNC: 14 MMOL/L — SIGNIFICANT CHANGE UP (ref 5–17)
AST SERPL-CCNC: 19 U/L — SIGNIFICANT CHANGE UP
BASOPHILS # BLD AUTO: 0.05 K/UL — SIGNIFICANT CHANGE UP (ref 0–0.2)
BASOPHILS NFR BLD AUTO: 0.3 % — SIGNIFICANT CHANGE UP (ref 0–2)
BILIRUB SERPL-MCNC: 0.4 MG/DL — SIGNIFICANT CHANGE UP (ref 0.4–2)
BUN SERPL-MCNC: 16.3 MG/DL — SIGNIFICANT CHANGE UP (ref 8–20)
CALCIUM SERPL-MCNC: 8.9 MG/DL — SIGNIFICANT CHANGE UP (ref 8.4–10.5)
CHLORIDE SERPL-SCNC: 103 MMOL/L — SIGNIFICANT CHANGE UP (ref 96–108)
CO2 SERPL-SCNC: 24 MMOL/L — SIGNIFICANT CHANGE UP (ref 22–29)
CREAT SERPL-MCNC: 0.54 MG/DL — SIGNIFICANT CHANGE UP (ref 0.5–1.3)
EGFR: 98 ML/MIN/1.73M2 — SIGNIFICANT CHANGE UP
EOSINOPHIL # BLD AUTO: 0.15 K/UL — SIGNIFICANT CHANGE UP (ref 0–0.5)
EOSINOPHIL NFR BLD AUTO: 0.9 % — SIGNIFICANT CHANGE UP (ref 0–6)
GLUCOSE SERPL-MCNC: 113 MG/DL — HIGH (ref 70–99)
HCT VFR BLD CALC: 37.5 % — SIGNIFICANT CHANGE UP (ref 34.5–45)
HGB BLD-MCNC: 12.6 G/DL — SIGNIFICANT CHANGE UP (ref 11.5–15.5)
IMM GRANULOCYTES NFR BLD AUTO: 0.5 % — SIGNIFICANT CHANGE UP (ref 0–0.9)
LYMPHOCYTES # BLD AUTO: 11.8 % — LOW (ref 13–44)
LYMPHOCYTES # BLD AUTO: 2.05 K/UL — SIGNIFICANT CHANGE UP (ref 1–3.3)
MCHC RBC-ENTMCNC: 29.9 PG — SIGNIFICANT CHANGE UP (ref 27–34)
MCHC RBC-ENTMCNC: 33.6 GM/DL — SIGNIFICANT CHANGE UP (ref 32–36)
MCV RBC AUTO: 89.1 FL — SIGNIFICANT CHANGE UP (ref 80–100)
MONOCYTES # BLD AUTO: 1.02 K/UL — HIGH (ref 0–0.9)
MONOCYTES NFR BLD AUTO: 5.9 % — SIGNIFICANT CHANGE UP (ref 2–14)
NEUTROPHILS # BLD AUTO: 13.99 K/UL — HIGH (ref 1.8–7.4)
NEUTROPHILS NFR BLD AUTO: 80.6 % — HIGH (ref 43–77)
PLATELET # BLD AUTO: 317 K/UL — SIGNIFICANT CHANGE UP (ref 150–400)
POTASSIUM SERPL-MCNC: 3.7 MMOL/L — SIGNIFICANT CHANGE UP (ref 3.5–5.3)
POTASSIUM SERPL-SCNC: 3.7 MMOL/L — SIGNIFICANT CHANGE UP (ref 3.5–5.3)
PROT SERPL-MCNC: 7.3 G/DL — SIGNIFICANT CHANGE UP (ref 6.6–8.7)
RBC # BLD: 4.21 M/UL — SIGNIFICANT CHANGE UP (ref 3.8–5.2)
RBC # FLD: 13.9 % — SIGNIFICANT CHANGE UP (ref 10.3–14.5)
SODIUM SERPL-SCNC: 141 MMOL/L — SIGNIFICANT CHANGE UP (ref 135–145)
WBC # BLD: 17.35 K/UL — HIGH (ref 3.8–10.5)
WBC # FLD AUTO: 17.35 K/UL — HIGH (ref 3.8–10.5)

## 2024-10-10 PROCEDURE — 99285 EMERGENCY DEPT VISIT HI MDM: CPT

## 2024-10-10 PROCEDURE — 73110 X-RAY EXAM OF WRIST: CPT | Mod: 26,LT

## 2024-10-10 PROCEDURE — 93010 ELECTROCARDIOGRAM REPORT: CPT

## 2024-10-10 RX ORDER — KETOROLAC TROMETHAMINE 10 MG/1
15 TABLET, FILM COATED ORAL ONCE
Refills: 0 | Status: DISCONTINUED | OUTPATIENT
Start: 2024-10-10 | End: 2024-10-10

## 2024-10-10 RX ADMIN — Medication 1000 MILLIGRAM(S): at 22:26

## 2024-10-10 RX ADMIN — KETOROLAC TROMETHAMINE 15 MILLIGRAM(S): 10 TABLET, FILM COATED ORAL at 22:25
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
## 2024-10-10 NOTE — ED PROVIDER NOTE - PHYSICAL EXAMINATION
General: non-toxic appearing, in no acute distress, A+Ox3  HENT: no facial/scalp/mastoid ttp or ecchymosis, teeth intact  Eyes: pink conjunctivae, EOMI, PERRLA  CV: RRR, nl s1/s2.   Resp: CTAB, normal rate and effort  GI: Abdomen soft, ttp of anterior 5-7 ribs L side. No rebound, no guarding  Neuro: CN II-XII intact, sensorimotor intact without deficits   MSK: No midline or join ttp, Full ROM and strength ext x 4. Normal Gait  Skin: No rashes, lacerations, abrasions, ecchymosis. intact and perfused.
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
## 2024-10-10 NOTE — ED PROVIDER NOTE - PATIENT PORTAL LINK FT
You can access the FollowMyHealth Patient Portal offered by [facility] by registering at the following website: [email or web address]. By joining [facility]’s FollowMyHealth portal, you will also be able to view your health information using other applications (apps) compatible with our system.
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
## 2024-10-11 PROCEDURE — 36415 COLL VENOUS BLD VENIPUNCTURE: CPT

## 2024-10-11 PROCEDURE — 99285 EMERGENCY DEPT VISIT HI MDM: CPT | Mod: 25

## 2024-10-11 PROCEDURE — 72125 CT NECK SPINE W/O DYE: CPT | Mod: MC

## 2024-10-11 PROCEDURE — 71250 CT THORAX DX C-: CPT | Mod: MC

## 2024-10-11 PROCEDURE — 74177 CT ABD & PELVIS W/CONTRAST: CPT | Mod: 26,MC

## 2024-10-11 PROCEDURE — 70450 CT HEAD/BRAIN W/O DYE: CPT | Mod: 26,MC

## 2024-10-11 PROCEDURE — 72125 CT NECK SPINE W/O DYE: CPT | Mod: 26,MC

## 2024-10-11 PROCEDURE — 96374 THER/PROPH/DIAG INJ IV PUSH: CPT | Mod: XU

## 2024-10-11 PROCEDURE — 73110 X-RAY EXAM OF WRIST: CPT

## 2024-10-11 PROCEDURE — 80053 COMPREHEN METABOLIC PANEL: CPT

## 2024-10-11 PROCEDURE — 93005 ELECTROCARDIOGRAM TRACING: CPT

## 2024-10-11 PROCEDURE — 70450 CT HEAD/BRAIN W/O DYE: CPT | Mod: MC

## 2024-10-11 PROCEDURE — 85025 COMPLETE CBC W/AUTO DIFF WBC: CPT

## 2024-10-11 PROCEDURE — 74177 CT ABD & PELVIS W/CONTRAST: CPT | Mod: MC

## 2024-10-11 PROCEDURE — 71250 CT THORAX DX C-: CPT | Mod: 26,MC

## 2024-10-24 ENCOUNTER — APPOINTMENT (OUTPATIENT)
Dept: DERMATOLOGY | Facility: CLINIC | Age: 74
End: 2024-10-24

## 2024-11-11 ENCOUNTER — APPOINTMENT (OUTPATIENT)
Facility: CLINIC | Age: 74
End: 2024-11-11
Payer: COMMERCIAL

## 2024-11-11 VITALS
DIASTOLIC BLOOD PRESSURE: 80 MMHG | BODY MASS INDEX: 32.02 KG/M2 | HEART RATE: 74 BPM | HEIGHT: 62 IN | SYSTOLIC BLOOD PRESSURE: 120 MMHG | RESPIRATION RATE: 16 BRPM | WEIGHT: 174 LBS | OXYGEN SATURATION: 99 %

## 2024-11-11 DIAGNOSIS — C50.112 MALIGNANT NEOPLASM OF CENTRAL PORTION OF LEFT FEMALE BREAST: ICD-10-CM

## 2024-11-11 DIAGNOSIS — Z90.13 ACQUIRED ABSENCE OF BILATERAL BREASTS AND NIPPLES: ICD-10-CM

## 2024-11-11 PROCEDURE — 99213 OFFICE O/P EST LOW 20 MIN: CPT

## 2024-12-16 ENCOUNTER — OUTPATIENT (OUTPATIENT)
Dept: OUTPATIENT SERVICES | Facility: HOSPITAL | Age: 74
LOS: 1 days | End: 2024-12-16
Payer: COMMERCIAL

## 2024-12-16 ENCOUNTER — RESULT REVIEW (OUTPATIENT)
Age: 74
End: 2024-12-16

## 2024-12-16 ENCOUNTER — APPOINTMENT (OUTPATIENT)
Dept: ULTRASOUND IMAGING | Facility: CLINIC | Age: 74
End: 2024-12-16
Payer: COMMERCIAL

## 2024-12-16 DIAGNOSIS — Z90.49 ACQUIRED ABSENCE OF OTHER SPECIFIED PARTS OF DIGESTIVE TRACT: Chronic | ICD-10-CM

## 2024-12-16 DIAGNOSIS — Z98.51 TUBAL LIGATION STATUS: Chronic | ICD-10-CM

## 2024-12-16 DIAGNOSIS — C50.112 MALIGNANT NEOPLASM OF CENTRAL PORTION OF LEFT FEMALE BREAST: ICD-10-CM

## 2024-12-16 DIAGNOSIS — Z98.82 BREAST IMPLANT STATUS: Chronic | ICD-10-CM

## 2024-12-16 DIAGNOSIS — Z98.891 HISTORY OF UTERINE SCAR FROM PREVIOUS SURGERY: Chronic | ICD-10-CM

## 2024-12-16 DIAGNOSIS — Z90.13 ACQUIRED ABSENCE OF BILATERAL BREASTS AND NIPPLES: Chronic | ICD-10-CM

## 2024-12-16 DIAGNOSIS — Z98.890 OTHER SPECIFIED POSTPROCEDURAL STATES: Chronic | ICD-10-CM

## 2024-12-16 PROCEDURE — 76642 ULTRASOUND BREAST LIMITED: CPT

## 2024-12-16 PROCEDURE — 76642 ULTRASOUND BREAST LIMITED: CPT | Mod: 26,LT

## 2024-12-16 PROCEDURE — 76641 ULTRASOUND BREAST COMPLETE: CPT

## 2024-12-17 ENCOUNTER — NON-APPOINTMENT (OUTPATIENT)
Age: 74
End: 2024-12-17

## 2025-08-04 ENCOUNTER — APPOINTMENT (OUTPATIENT)
Dept: DERMATOLOGY | Facility: CLINIC | Age: 75
End: 2025-08-04

## 2025-08-11 ENCOUNTER — APPOINTMENT (OUTPATIENT)
Facility: CLINIC | Age: 75
End: 2025-08-11